# Patient Record
Sex: MALE | Race: WHITE
[De-identification: names, ages, dates, MRNs, and addresses within clinical notes are randomized per-mention and may not be internally consistent; named-entity substitution may affect disease eponyms.]

---

## 2018-02-08 ENCOUNTER — HOSPITAL ENCOUNTER (EMERGENCY)
Dept: HOSPITAL 53 - M ED | Age: 53
Discharge: HOME | End: 2018-02-08
Payer: MEDICARE

## 2018-02-08 DIAGNOSIS — Z88.0: ICD-10-CM

## 2018-02-08 DIAGNOSIS — Z91.041: ICD-10-CM

## 2018-02-08 DIAGNOSIS — G40.509: ICD-10-CM

## 2018-02-08 DIAGNOSIS — K76.9: ICD-10-CM

## 2018-02-08 DIAGNOSIS — K80.20: ICD-10-CM

## 2018-02-08 DIAGNOSIS — R16.0: ICD-10-CM

## 2018-02-08 DIAGNOSIS — F10.129: Primary | ICD-10-CM

## 2018-02-08 LAB
ALBUMIN/GLOBULIN RATIO: 0.63 (ref 1–1.93)
ALBUMIN: 3.1 GM/DL (ref 3.2–5.2)
ALKALINE PHOSPHATASE: 149 U/L (ref 45–117)
ALT SERPL W P-5'-P-CCNC: 34 U/L (ref 12–78)
ANION GAP: 12 MEQ/L (ref 8–16)
AST SERPL-CCNC: 85 U/L (ref 7–37)
BASO #: 0 10^3/UL (ref 0–0.2)
BASO %: 1 % (ref 0–1)
BILIRUB CONJ SERPL-MCNC: 2.9 MG/DL (ref 0–0.2)
BILIRUBIN,TOTAL: 5.7 MG/DL (ref 0.2–1)
BLOOD UREA NITROGEN: 8 MG/DL (ref 7–18)
CALCIUM LEVEL: 8.2 MG/DL (ref 8.5–10.1)
CARBON DIOXIDE LEVEL: 23 MEQ/L (ref 21–32)
CHLORIDE LEVEL: 100 MEQ/L (ref 98–107)
CREATININE FOR GFR: 0.89 MG/DL (ref 0.7–1.3)
EOS #: 0 10^3/UL (ref 0–0.5)
EOSINOPHIL NFR BLD AUTO: 0.2 % (ref 0–3)
GFR SERPL CREATININE-BSD FRML MDRD: > 60 ML/MIN/{1.73_M2} (ref 56–?)
GLUCOSE, FASTING: 151 MG/DL (ref 70–100)
HEMATOCRIT: 35.2 % (ref 42–52)
HEMOGLOBIN: 12.5 G/DL (ref 14–18)
IMMATURE GRANULOCYTE %: 0.2 % (ref 0–3)
IMMATURE PLATELET FRACTION %: 6.4 % (ref 0–10.9)
INR: 1.55
LIPASE: 268 U/L (ref 73–393)
LYMPH #: 0.6 10^3/UL (ref 1.5–4.5)
LYMPH %: 15 % (ref 24–44)
MAGNESIUM LEVEL: 2.1 MG/DL (ref 1.8–2.4)
MEAN CORPUSCULAR HEMOGLOBIN: 37.5 PG (ref 27–33)
MEAN CORPUSCULAR HGB CONC: 35.5 G/DL (ref 32–36.5)
MEAN CORPUSCULAR VOLUME: 105.7 FL (ref 80–96)
MONO #: 0.5 10^3/UL (ref 0–0.8)
MONO %: 11.4 % (ref 0–5)
NEUTROPHILS #: 3 10^3/UL (ref 1.8–7.7)
NEUTROPHILS %: 72.2 % (ref 36–66)
NRBC BLD AUTO-RTO: 0 % (ref 0–0)
PLATELET COUNT, AUTOMATED: 51 10^3/UL (ref 150–450)
POTASSIUM SERUM: 4.2 MEQ/L (ref 3.5–5.1)
PROTHROMBIN TIME: 19 SECONDS (ref 12.4–14.5)
RED BLOOD COUNT: 3.33 10^6/UL (ref 4.3–6.1)
RED CELL DISTRIBUTION WIDTH: 16.6 % (ref 11.5–14.5)
SODIUM LEVEL: 135 MEQ/L (ref 136–145)
TOTAL PROTEIN: 8 GM/DL (ref 6.4–8.2)
WHITE BLOOD COUNT: 4.1 10^3/UL (ref 4–10)

## 2018-02-08 RX ADMIN — MORPHINE SULFATE 1 MG: 4 INJECTION INTRAVENOUS at 16:25

## 2018-02-08 RX ADMIN — SODIUM CHLORIDE 1 MLS/HR: 9 INJECTION, SOLUTION INTRAVENOUS at 16:00

## 2018-02-08 RX ADMIN — LORAZEPAM 1 MG: 2 INJECTION INTRAMUSCULAR; INTRAVENOUS at 16:28

## 2018-02-08 RX ADMIN — ONDANSETRON 1 MG: 2 INJECTION INTRAMUSCULAR; INTRAVENOUS at 16:24

## 2019-01-20 ENCOUNTER — HOSPITAL ENCOUNTER (INPATIENT)
Dept: HOSPITAL 53 - M ED | Age: 54
LOS: 47 days | Discharge: NURSING, REHAB OR LONG-TERM CARE | DRG: 205 | End: 2019-03-08
Attending: INTERNAL MEDICINE | Admitting: INTERNAL MEDICINE
Payer: MEDICARE

## 2019-01-20 VITALS — SYSTOLIC BLOOD PRESSURE: 154 MMHG | DIASTOLIC BLOOD PRESSURE: 82 MMHG

## 2019-01-20 VITALS — DIASTOLIC BLOOD PRESSURE: 73 MMHG | SYSTOLIC BLOOD PRESSURE: 130 MMHG

## 2019-01-20 VITALS — DIASTOLIC BLOOD PRESSURE: 69 MMHG | SYSTOLIC BLOOD PRESSURE: 126 MMHG

## 2019-01-20 VITALS — SYSTOLIC BLOOD PRESSURE: 130 MMHG | DIASTOLIC BLOOD PRESSURE: 65 MMHG

## 2019-01-20 VITALS — DIASTOLIC BLOOD PRESSURE: 69 MMHG | SYSTOLIC BLOOD PRESSURE: 123 MMHG

## 2019-01-20 VITALS — DIASTOLIC BLOOD PRESSURE: 64 MMHG | SYSTOLIC BLOOD PRESSURE: 129 MMHG

## 2019-01-20 VITALS — DIASTOLIC BLOOD PRESSURE: 77 MMHG | SYSTOLIC BLOOD PRESSURE: 119 MMHG

## 2019-01-20 VITALS — DIASTOLIC BLOOD PRESSURE: 76 MMHG | SYSTOLIC BLOOD PRESSURE: 138 MMHG

## 2019-01-20 VITALS — SYSTOLIC BLOOD PRESSURE: 114 MMHG | DIASTOLIC BLOOD PRESSURE: 66 MMHG

## 2019-01-20 VITALS — DIASTOLIC BLOOD PRESSURE: 69 MMHG | SYSTOLIC BLOOD PRESSURE: 124 MMHG

## 2019-01-20 VITALS — SYSTOLIC BLOOD PRESSURE: 130 MMHG | DIASTOLIC BLOOD PRESSURE: 83 MMHG

## 2019-01-20 VITALS — SYSTOLIC BLOOD PRESSURE: 140 MMHG | DIASTOLIC BLOOD PRESSURE: 73 MMHG

## 2019-01-20 VITALS — DIASTOLIC BLOOD PRESSURE: 82 MMHG | SYSTOLIC BLOOD PRESSURE: 159 MMHG

## 2019-01-20 VITALS — SYSTOLIC BLOOD PRESSURE: 120 MMHG | DIASTOLIC BLOOD PRESSURE: 74 MMHG

## 2019-01-20 VITALS — DIASTOLIC BLOOD PRESSURE: 67 MMHG | SYSTOLIC BLOOD PRESSURE: 136 MMHG

## 2019-01-20 VITALS — HEIGHT: 70 IN | BODY MASS INDEX: 21.81 KG/M2 | WEIGHT: 152.34 LBS

## 2019-01-20 VITALS — DIASTOLIC BLOOD PRESSURE: 73 MMHG | SYSTOLIC BLOOD PRESSURE: 124 MMHG

## 2019-01-20 VITALS — SYSTOLIC BLOOD PRESSURE: 116 MMHG | DIASTOLIC BLOOD PRESSURE: 65 MMHG

## 2019-01-20 VITALS — DIASTOLIC BLOOD PRESSURE: 69 MMHG | SYSTOLIC BLOOD PRESSURE: 133 MMHG

## 2019-01-20 VITALS — SYSTOLIC BLOOD PRESSURE: 133 MMHG | DIASTOLIC BLOOD PRESSURE: 65 MMHG

## 2019-01-20 VITALS — SYSTOLIC BLOOD PRESSURE: 140 MMHG | DIASTOLIC BLOOD PRESSURE: 98 MMHG

## 2019-01-20 VITALS — DIASTOLIC BLOOD PRESSURE: 71 MMHG | SYSTOLIC BLOOD PRESSURE: 119 MMHG

## 2019-01-20 VITALS — DIASTOLIC BLOOD PRESSURE: 65 MMHG | SYSTOLIC BLOOD PRESSURE: 130 MMHG

## 2019-01-20 VITALS — SYSTOLIC BLOOD PRESSURE: 114 MMHG | DIASTOLIC BLOOD PRESSURE: 61 MMHG

## 2019-01-20 VITALS — SYSTOLIC BLOOD PRESSURE: 126 MMHG | DIASTOLIC BLOOD PRESSURE: 64 MMHG

## 2019-01-20 VITALS — SYSTOLIC BLOOD PRESSURE: 126 MMHG | DIASTOLIC BLOOD PRESSURE: 71 MMHG

## 2019-01-20 VITALS — DIASTOLIC BLOOD PRESSURE: 72 MMHG | SYSTOLIC BLOOD PRESSURE: 123 MMHG

## 2019-01-20 VITALS — SYSTOLIC BLOOD PRESSURE: 158 MMHG | DIASTOLIC BLOOD PRESSURE: 82 MMHG

## 2019-01-20 VITALS — SYSTOLIC BLOOD PRESSURE: 147 MMHG | DIASTOLIC BLOOD PRESSURE: 73 MMHG

## 2019-01-20 VITALS — DIASTOLIC BLOOD PRESSURE: 73 MMHG | SYSTOLIC BLOOD PRESSURE: 127 MMHG

## 2019-01-20 DIAGNOSIS — Z79.899: ICD-10-CM

## 2019-01-20 DIAGNOSIS — F17.200: ICD-10-CM

## 2019-01-20 DIAGNOSIS — M54.5: ICD-10-CM

## 2019-01-20 DIAGNOSIS — J43.9: ICD-10-CM

## 2019-01-20 DIAGNOSIS — Z88.0: ICD-10-CM
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
## 2019-01-20 DIAGNOSIS — Z91.041: ICD-10-CM

## 2019-01-20 DIAGNOSIS — E87.0: ICD-10-CM
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
## 2019-01-20 DIAGNOSIS — I48.0: ICD-10-CM

## 2019-01-20 DIAGNOSIS — K70.0: ICD-10-CM

## 2019-01-20 DIAGNOSIS — K70.30: ICD-10-CM

## 2019-01-20 DIAGNOSIS — J69.0: ICD-10-CM

## 2019-01-20 DIAGNOSIS — S22.20XA: ICD-10-CM

## 2019-01-20 DIAGNOSIS — F10.20: ICD-10-CM

## 2019-01-20 DIAGNOSIS — D68.4: ICD-10-CM

## 2019-01-20 DIAGNOSIS — K70.40: ICD-10-CM

## 2019-01-20 DIAGNOSIS — Z66: ICD-10-CM

## 2019-01-20 DIAGNOSIS — D69.6: ICD-10-CM

## 2019-01-20 DIAGNOSIS — S27.329A: Primary | ICD-10-CM

## 2019-01-20 LAB
ALBUMIN SERPL BCG-MCNC: 2.1 GM/DL (ref 3.2–5.2)
ALBUMIN SERPL BCG-MCNC: 2.3 GM/DL (ref 3.2–5.2)
ALT SERPL W P-5'-P-CCNC: 34 U/L (ref 12–78)
ALT SERPL W P-5'-P-CCNC: 38 U/L (ref 12–78)
APTT BLD: 51.4 SECONDS (ref 25.4–37.6)
BASE EXCESS BLDA CALC-SCNC: 4.3 MMOL/L (ref -2–2)
BASOPHILS # BLD AUTO: 0.1 10^3/UL (ref 0–0.2)
BASOPHILS NFR BLD AUTO: 1.2 % (ref 0–1)
BILIRUB CONJ SERPL-MCNC: 4.7 MG/DL (ref 0–0.2)
BILIRUB SERPL-MCNC: 6.9 MG/DL (ref 0.2–1)
BILIRUB SERPL-MCNC: 7.1 MG/DL (ref 0.2–1)
BUN SERPL-MCNC: 6 MG/DL (ref 7–18)
BUN SERPL-MCNC: 7 MG/DL (ref 7–18)
CALCIUM SERPL-MCNC: 7.6 MG/DL (ref 8.5–10.1)
CALCIUM SERPL-MCNC: 7.8 MG/DL (ref 8.5–10.1)
CHLORIDE SERPL-SCNC: 100 MEQ/L (ref 98–107)
CHLORIDE SERPL-SCNC: 98 MEQ/L (ref 98–107)
CK MB CFR.DF SERPL CALC: 0.77
CK MB SERPL-MCNC: 4 NG/ML (ref ?–3.6)
CK SERPL-CCNC: 466 U/L (ref 39–308)
CO2 BLDA CALC-SCNC: 28.9 MEQ/L (ref 22–29)
CO2 SERPL-SCNC: 22 MEQ/L (ref 21–32)
CO2 SERPL-SCNC: 28 MEQ/L (ref 21–32)
CREAT SERPL-MCNC: 0.6 MG/DL (ref 0.7–1.3)
CREAT SERPL-MCNC: 0.89 MG/DL (ref 0.7–1.3)
CRP SERPL-MCNC: 0.44 MG/DL (ref 0–0.3)
EOSINOPHIL # BLD AUTO: 0.1 10^3/UL (ref 0–0.5)
EOSINOPHIL NFR BLD AUTO: 1.4 % (ref 0–3)
GFR SERPL CREATININE-BSD FRML MDRD: > 60 ML/MIN/{1.73_M2} (ref 56–?)
GFR SERPL CREATININE-BSD FRML MDRD: > 60 ML/MIN/{1.73_M2} (ref 56–?)
GLUCOSE SERPL-MCNC: 114 MG/DL (ref 70–100)
GLUCOSE SERPL-MCNC: 158 MG/DL (ref 70–100)
HCO3 BLDA-SCNC: 27.8 MEQ/L (ref 22–26)
HCO3 STD BLDA-SCNC: 28.3 MEQ/L (ref 22–26)
HCT VFR BLD AUTO: 26.3 % (ref 42–52)
HCT VFR BLD AUTO: 29.2 % (ref 42–52)
HGB BLD-MCNC: 10.1 G/DL (ref 13.5–17.5)
HGB BLD-MCNC: 8.6 G/DL (ref 13.5–17.5)
INR PPP: 1.93
LIPASE SERPL-CCNC: 264 U/L (ref 73–393)
LYMPHOCYTES # BLD AUTO: 1.7 10^3/UL (ref 1.5–4.5)
LYMPHOCYTES NFR BLD AUTO: 33 % (ref 24–44)
MACROCYTES BLD QL SMEAR: (no result)
MAGNESIUM SERPL-MCNC: 1.5 MG/DL (ref 1.8–2.4)
MCH RBC QN AUTO: 39.8 PG (ref 27–33)
MCH RBC QN AUTO: 41.1 PG (ref 27–33)
MCHC RBC AUTO-ENTMCNC: 32.7 G/DL (ref 32–36.5)
MCHC RBC AUTO-ENTMCNC: 34.6 G/DL (ref 32–36.5)
MCV RBC AUTO: 118.7 FL (ref 80–96)
MCV RBC AUTO: 121.8 FL (ref 80–96)
MONOCYTES # BLD AUTO: 1.1 10^3/UL (ref 0–0.8)
MONOCYTES NFR BLD AUTO: 20.9 % (ref 0–5)
NEUTROPHILS # BLD AUTO: 2.2 10^3/UL (ref 1.8–7.7)
NEUTROPHILS NFR BLD AUTO: 42.5 % (ref 36–66)
PCO2 BLDA: 37.2 MMHG (ref 35–45)
PH BLDA: 7.49 UNITS (ref 7.35–7.45)
PLATELET # BLD AUTO: 48 10^3/UL (ref 150–450)
PLATELET # BLD AUTO: 75 10^3/UL (ref 150–450)
PLATELET BLD QL SMEAR: (no result)
PO2 BLDA: 139.8 MMHG (ref 75–100)
POTASSIUM SERPL-SCNC: 3.4 MEQ/L (ref 3.5–5.1)
POTASSIUM SERPL-SCNC: 3.6 MEQ/L (ref 3.5–5.1)
PROT SERPL-MCNC: 7 GM/DL (ref 6.4–8.2)
PROT SERPL-MCNC: 8.1 GM/DL (ref 6.4–8.2)
PROTHROMBIN TIME: 22.5 SECONDS (ref 12.1–14.4)
RBC # BLD AUTO: 2.16 10^6/UL (ref 4.3–6.1)
RBC # BLD AUTO: 2.46 10^6/UL (ref 4.3–6.1)
SAO2 % BLDA: 99.1 % (ref 95–99)
SODIUM SERPL-SCNC: 136 MEQ/L (ref 136–145)
SODIUM SERPL-SCNC: 137 MEQ/L (ref 136–145)
TROPONIN I SERPL-MCNC: < 0.02 NG/ML (ref ?–0.1)
WBC # BLD AUTO: 5.1 10^3/UL (ref 4–10)
WBC # BLD AUTO: 5.9 10^3/UL (ref 4–10)

## 2019-01-20 PROCEDURE — 30233K1 TRANSFUSION OF NONAUTOLOGOUS FROZEN PLASMA INTO PERIPHERAL VEIN, PERCUTANEOUS APPROACH: ICD-10-PCS | Performed by: INTERNAL MEDICINE

## 2019-01-20 PROCEDURE — 30233R1 TRANSFUSION OF NONAUTOLOGOUS PLATELETS INTO PERIPHERAL VEIN, PERCUTANEOUS APPROACH: ICD-10-PCS | Performed by: INTERNAL MEDICINE

## 2019-01-20 RX ADMIN — NICOTINE SCH PATCH: 21 PATCH, EXTENDED RELEASE TRANSDERMAL at 17:58

## 2019-01-20 RX ADMIN — OXAZEPAM SCH MG: 10 CAPSULE, GELATIN COATED ORAL at 16:35

## 2019-01-20 RX ADMIN — OXAZEPAM SCH MG: 10 CAPSULE, GELATIN COATED ORAL at 18:00

## 2019-01-20 RX ADMIN — IPRATROPIUM BROMIDE AND ALBUTEROL SULFATE SCH ML: .5; 3 SOLUTION RESPIRATORY (INHALATION) at 19:47

## 2019-01-20 RX ADMIN — DEXTROSE MONOHYDRATE SCH MLS/HR: 50 INJECTION, SOLUTION INTRAVENOUS at 21:38

## 2019-01-20 RX ADMIN — OXAZEPAM SCH MG: 10 CAPSULE, GELATIN COATED ORAL at 23:55

## 2019-01-20 RX ADMIN — Medication SCH MG: at 20:17

## 2019-01-20 RX ADMIN — IPRATROPIUM BROMIDE AND ALBUTEROL SULFATE SCH ML: .5; 3 SOLUTION RESPIRATORY (INHALATION) at 12:00

## 2019-01-20 NOTE — REPVR
EXAM: 

 CT Head Without Contrast 



EXAM DATE/TIME: 

 1/20/2019 7:16 AM 



CLINICAL HISTORY: 

 53 years old, male; Injury or trauma; Fall; Initial encounter; Concussion / 

head injury; Consciousness not specified 



TECHNIQUE: 

 Axial computed tomography images of the head/brain without contrast. 

 All CT scans at this facility use at least one of these dose optimization 

techniques: automated exposure control; mA and/or kV adjustment per patient 

size (includes targeted exams where dose is matched to clinical indication); or 

iterative reconstruction. 



COMPARISON: 

 CT Head without contrast 2/8/2018 3:57 PM 



FINDINGS: 

 Limitations:  Examination is limited by motion artifact. 

 Brain:  No acute intracranial hemorrhage. Diffuse cerebral atrophy consistent 

with patient's age. No acute mass effect. Cortical gray-white matter 

differentiation is preserved.

 Ventricles: Ventricles are in proportion to the degree of atrophy.

 Bones/joints: Normal. No acute fracture. 

 Sinuses: Normal as visualized. No acute sinusitis. 

 Mastoid air cells: Normal as visualized. No mastoid effusion. 

 Soft tissues: Normal. 



IMPRESSION: 

No acute intracranial hemorrhage.

Mild hypodensities in the periventricular white matter which are consistent 

with chronic small vessel ischemic disease. Unchanged from prior.

Ventricles are in proportion to the degree of atrophy. Mildly increased from 

prior.



Electronically signed by: Meme Ramirez On 01/20/2019  07:41:46 AM

## 2019-01-20 NOTE — ECGEPIP
Stationary ECG Study

                           [facility] - ED

                                       

                                       Test Date:    2019

Pat Name:     KIRILL BULLOCK               Department:   

Patient ID:   [id number]                 Room:         -

Gender:       M                        Technician:   lisbeth

:          1965               Requested By: Maja Lundborg-Gray 

Order Number: [id number]     Reading MD:   Maja Lundborg-Gray

                                 Measurements

Intervals                              Axis          

Rate:         94                       P:            52

WI:           164                      QRS:          66

QRSD:         83                       T:            49

QT:           374                                    

QTc:          470                                    

                           Interpretive Statements

SINUS RHYTHM

NONSPECIFIC ST T WAVE CHANGES

 

CW 18 RATE INCREASED

NONSPECIFIC ST T WAVE CHANGES

 

Electronically Signed On 2019 9:35:36 EST by Maja Lundborg-Gray

## 2019-01-20 NOTE — PHACANCOPD
PHARMACY VANCOMYCIN DOSING


Pt Demographics


Demographics


Patient Age:53 , Weight:89.700  , Gender: male


Adjusted Body Weight


Date: 1/20/19, Adjusted Body Weight: [NA] Kg





Events Past 24 Hours


Events Past 24 Hours:  NO: Dialysis, Diuretic Therapy, Change in CrCl, Fever, 

Elevation in WBC, Pending Diagnostics, Pending Procedures, Other





Vancomycin


Vancomycin indication:  pneumonia


Vancomycin Target Ranges:  15-20 mcg/ml


Vancomycin Load Y/N:  Yes


Load Dose Date Time


Vancomycin Load Dose:  1750mg       Date: 1/20        Time:  ~20:00


Vancomycin Dose


Date: 1/20/19. Current Vancomycin Dose: [1g IV q12h @09]


Intermittent Dosing?:  Yes





Labs


Labs











Item Value  Date Time


 


Creatinine 0.60 MG/DL L 1/20/19 0217


 


White Blood Count 5.1 10^3/uL 1/20/19 0217








Micro





Microbiology


1/20/19 Blood Culture, Received


          Pending


1/20/19 Blood Culture, Received


          Pending


Creatinine Clearance


Date:1/20/19. Creatinine Clearance: [>100 ml/min].


Pending Labs


Vanco trough scheduled 1/22 @08:00





Assessment and Plan


Maintaining Current Dose?:  Yes


Reason for dose change:  No Dose Change





Pharmacist Note


Pharmacist Note


Date: 1/20/19. Pharmacist note: pt has been started on Avelox and vancomycin for

pneumonia. Pt has not been on vancomycin at our facility in the past. Sputum 

culture from 2017 grew Pseudomonas. Current blood cultures are pending. I have 

started the pt on vancomycin 750mg IV now, with 1g IV q12h starting ~1 hour 

later for a 1.75g load. We will continue to monitor and make adjustments as 

necessary.











Bar Jorgensen Pharm.D.           Jan 20, 2019 18:55
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
## 2019-01-20 NOTE — REPVR
EXAM: 

 CT Abdomen and Pelvis Without Contrast 



EXAM DATE/TIME: 

 1/20/2019 2:40 AM 



CLINICAL HISTORY: 

 53 years old, male; Pain; Abdominal pain; Additional info: Tr/contrast allergy 



TECHNIQUE: 

 Axial computed tomography images of the abdomen and pelvis without contrast. 

 All CT scans at this facility use at least one of these dose optimization 

techniques: automated exposure control; mA and/or kV adjustment per patient 

size (includes targeted exams where dose is matched to clinical indication); or 

iterative reconstruction. 

 Coronal and sagittal reformatted images were created and reviewed. 



COMPARISON: 

 LIVER US 2/8/2018 5:07 PM 



FINDINGS: 

 Limitations:  Examination is limited by motion artifact. 

 Lower thorax:  See CT chest report. 



ABDOMEN: 

 Liver:  Mildly lobulated contour of the liver. Fatty infiltration of the 

liver. 

 Gallbladder and bile ducts:  Multiple small gallstones layering in the 

gallbladder. No gallbladder wall thickening. No biliary ductal dilatation. 

 Pancreas: Normal. No ductal dilation. 

 Spleen: Normal. No splenomegaly. 

 Adrenals: Normal. No mass. 

 Kidneys and ureters: Normal. No hydronephrosis. 

 Stomach and bowel:  Copious stool throughout the colon. Negative for colonic 

diverticulitis. No abnormal bowel dilatation. No abnormal bowel wall 

thickening. 

 Appendix:  Appendix is normal. 



PELVIS: 

 Bladder: Unremarkable as visualized. 

 Reproductive:  There are calcifications within the prostate which are likely 

incidental. Prostate is not enlarged.



ABDOMEN and PELVIS: 

 Intraperitoneal space: Normal. No free air. No significant fluid collection. 

 Bones/joints:  Right L5 spondylolysis. No acute fracture.

 Soft tissues:  Small supraumbilical hernia containing loops of small bowel. No 

evidence of bowel obstruction. Small umbilical hernia containing fat. There is 

no evidence of strangulation. Post surgical changes in left inguinal region. 

There is dependent subcutaneous edema.

 Vasculature:  Moderate calcified atherosclerotic disease. No aortic aneurysm.

 Lymph nodes: Normal. No enlarged lymph nodes. 



IMPRESSION: 

Mildly lobulated contour of the liver. Possible mild cirrhosis.

Fatty infiltration of the liver. 

Cholelithiasis without acute cholecystitis.

Copious stool throughout the colon.

Small supraumbilical hernia containing loops of small bowel. No evidence of 

bowel obstruction.

Additional findings as described.





Electronically signed by: Meme Ramirez On 01/20/2019  03:16:04 AM

## 2019-01-21 VITALS — SYSTOLIC BLOOD PRESSURE: 133 MMHG | DIASTOLIC BLOOD PRESSURE: 66 MMHG

## 2019-01-21 VITALS — SYSTOLIC BLOOD PRESSURE: 131 MMHG | DIASTOLIC BLOOD PRESSURE: 64 MMHG

## 2019-01-21 VITALS — SYSTOLIC BLOOD PRESSURE: 140 MMHG | DIASTOLIC BLOOD PRESSURE: 70 MMHG

## 2019-01-21 VITALS — SYSTOLIC BLOOD PRESSURE: 141 MMHG | DIASTOLIC BLOOD PRESSURE: 63 MMHG

## 2019-01-21 VITALS — DIASTOLIC BLOOD PRESSURE: 69 MMHG | SYSTOLIC BLOOD PRESSURE: 133 MMHG

## 2019-01-21 VITALS — SYSTOLIC BLOOD PRESSURE: 154 MMHG | DIASTOLIC BLOOD PRESSURE: 79 MMHG

## 2019-01-21 VITALS — SYSTOLIC BLOOD PRESSURE: 143 MMHG | DIASTOLIC BLOOD PRESSURE: 71 MMHG

## 2019-01-21 VITALS — SYSTOLIC BLOOD PRESSURE: 132 MMHG | DIASTOLIC BLOOD PRESSURE: 67 MMHG

## 2019-01-21 VITALS — DIASTOLIC BLOOD PRESSURE: 69 MMHG | SYSTOLIC BLOOD PRESSURE: 141 MMHG

## 2019-01-21 VITALS — DIASTOLIC BLOOD PRESSURE: 81 MMHG | SYSTOLIC BLOOD PRESSURE: 141 MMHG

## 2019-01-21 VITALS — SYSTOLIC BLOOD PRESSURE: 125 MMHG | DIASTOLIC BLOOD PRESSURE: 61 MMHG

## 2019-01-21 VITALS — SYSTOLIC BLOOD PRESSURE: 132 MMHG | DIASTOLIC BLOOD PRESSURE: 86 MMHG

## 2019-01-21 VITALS — SYSTOLIC BLOOD PRESSURE: 130 MMHG | DIASTOLIC BLOOD PRESSURE: 77 MMHG

## 2019-01-21 VITALS — SYSTOLIC BLOOD PRESSURE: 156 MMHG | DIASTOLIC BLOOD PRESSURE: 82 MMHG

## 2019-01-21 LAB
ALBUMIN SERPL BCG-MCNC: 2 GM/DL (ref 3.2–5.2)
ALT SERPL W P-5'-P-CCNC: 32 U/L (ref 12–78)
APTT BLD: 47.2 SECONDS (ref 25.4–37.6)
BASOPHILS # BLD AUTO: 0 10^3/UL (ref 0–0.2)
BASOPHILS NFR BLD AUTO: 0.1 % (ref 0–1)
BILIRUB SERPL-MCNC: 6.4 MG/DL (ref 0.2–1)
BUN SERPL-MCNC: 10 MG/DL (ref 7–18)
BUN SERPL-MCNC: 8 MG/DL (ref 7–18)
CALCIUM SERPL-MCNC: 7.8 MG/DL (ref 8.5–10.1)
CALCIUM SERPL-MCNC: 7.8 MG/DL (ref 8.5–10.1)
CHLORIDE SERPL-SCNC: 102 MEQ/L (ref 98–107)
CHLORIDE SERPL-SCNC: 104 MEQ/L (ref 98–107)
CO2 SERPL-SCNC: 25 MEQ/L (ref 21–32)
CO2 SERPL-SCNC: 27 MEQ/L (ref 21–32)
CREAT SERPL-MCNC: 0.62 MG/DL (ref 0.7–1.3)
CREAT SERPL-MCNC: 0.72 MG/DL (ref 0.7–1.3)
EOSINOPHIL # BLD AUTO: 0 10^3/UL (ref 0–0.5)
EOSINOPHIL NFR BLD AUTO: 0 % (ref 0–3)
FERRITIN SERPL-MCNC: 736 NG/ML (ref 26–388)
FOLATE SERPL-MCNC: 6.4 NG/ML
GFR SERPL CREATININE-BSD FRML MDRD: > 60 ML/MIN/{1.73_M2} (ref 56–?)
GFR SERPL CREATININE-BSD FRML MDRD: > 60 ML/MIN/{1.73_M2} (ref 56–?)
GLUCOSE SERPL-MCNC: 106 MG/DL (ref 70–100)
GLUCOSE SERPL-MCNC: 88 MG/DL (ref 70–100)
HCT VFR BLD AUTO: 24.6 % (ref 42–52)
HGB BLD-MCNC: 8.1 G/DL (ref 13.5–17.5)
INR PPP: 2.02
IRON SATN MFR SERPL: 45.9 % (ref 19.7–50)
IRON SERPL-MCNC: 72 UG/DL (ref 65–175)
LYMPHOCYTES # BLD AUTO: 1.4 10^3/UL (ref 1.5–4.5)
LYMPHOCYTES NFR BLD AUTO: 14.4 % (ref 24–44)
MACROCYTES BLD QL SMEAR: (no result)
MAGNESIUM SERPL-MCNC: 1.6 MG/DL (ref 1.8–2.4)
MAGNESIUM SERPL-MCNC: 1.8 MG/DL (ref 1.8–2.4)
MCH RBC QN AUTO: 40.1 PG (ref 27–33)
MCHC RBC AUTO-ENTMCNC: 32.9 G/DL (ref 32–36.5)
MCV RBC AUTO: 121.8 FL (ref 80–96)
MONOCYTES # BLD AUTO: 1.2 10^3/UL (ref 0–0.8)
MONOCYTES NFR BLD AUTO: 13.1 % (ref 0–5)
NEUTROPHILS # BLD AUTO: 6.8 10^3/UL (ref 1.8–7.7)
NEUTROPHILS NFR BLD AUTO: 72.1 % (ref 36–66)
PLATELET # BLD AUTO: 68 10^3/UL (ref 150–450)
PLATELET BLD QL SMEAR: (no result)
POLYCHROMASIA BLD QL SMEAR: (no result)
POTASSIUM SERPL-SCNC: 3.1 MEQ/L (ref 3.5–5.1)
POTASSIUM SERPL-SCNC: 3.7 MEQ/L (ref 3.5–5.1)
PROT SERPL-MCNC: 6.9 GM/DL (ref 6.4–8.2)
PROTHROMBIN TIME: 23.2 SECONDS (ref 12.1–14.4)
RBC # BLD AUTO: 2.02 10^6/UL (ref 4.3–6.1)
SODIUM SERPL-SCNC: 137 MEQ/L (ref 136–145)
SODIUM SERPL-SCNC: 138 MEQ/L (ref 136–145)
TIBC SERPL-MCNC: 157 UG/DL (ref 250–450)
WBC # BLD AUTO: 9.5 10^3/UL (ref 4–10)

## 2019-01-21 RX ADMIN — IPRATROPIUM BROMIDE AND ALBUTEROL SULFATE SCH ML: .5; 3 SOLUTION RESPIRATORY (INHALATION) at 11:18

## 2019-01-21 RX ADMIN — FOLIC ACID SCH MG: 1 TABLET ORAL at 08:54

## 2019-01-21 RX ADMIN — MULTIPLE VITAMINS W/ MINERALS TAB SCH TAB: TAB at 08:54

## 2019-01-21 RX ADMIN — IPRATROPIUM BROMIDE AND ALBUTEROL SULFATE SCH ML: .5; 3 SOLUTION RESPIRATORY (INHALATION) at 06:26

## 2019-01-21 RX ADMIN — IPRATROPIUM BROMIDE AND ALBUTEROL SULFATE SCH ML: .5; 3 SOLUTION RESPIRATORY (INHALATION) at 19:59

## 2019-01-21 RX ADMIN — DEXTROSE MONOHYDRATE SCH MLS/HR: 50 INJECTION, SOLUTION INTRAVENOUS at 08:54

## 2019-01-21 RX ADMIN — Medication SCH MLS/HR: at 08:54

## 2019-01-21 RX ADMIN — OXAZEPAM SCH MG: 10 CAPSULE, GELATIN COATED ORAL at 16:04

## 2019-01-21 RX ADMIN — POTASSIUM CHLORIDE SCH MEQ: 1.5 POWDER, FOR SOLUTION ORAL at 06:33

## 2019-01-21 RX ADMIN — NICOTINE SCH PATCH: 21 PATCH, EXTENDED RELEASE TRANSDERMAL at 08:53

## 2019-01-21 RX ADMIN — IPRATROPIUM BROMIDE AND ALBUTEROL SULFATE SCH ML: .5; 3 SOLUTION RESPIRATORY (INHALATION) at 15:28

## 2019-01-21 RX ADMIN — Medication SCH MG: at 08:54

## 2019-01-21 RX ADMIN — POTASSIUM CHLORIDE SCH MEQ: 1.5 POWDER, FOR SOLUTION ORAL at 04:11

## 2019-01-21 RX ADMIN — METRONIDAZOLE SCH MLS/HR: 500 INJECTION, SOLUTION INTRAVENOUS at 16:04

## 2019-01-21 RX ADMIN — SODIUM CHLORIDE, PRESERVATIVE FREE SCH ML: 5 INJECTION INTRAVENOUS at 20:12

## 2019-01-21 RX ADMIN — LACTULOSE SCH ML: 10 SOLUTION ORAL at 18:00

## 2019-01-21 RX ADMIN — LACTULOSE SCH ML: 10 SOLUTION ORAL at 18:16

## 2019-01-21 RX ADMIN — Medication SCH MG: at 20:11

## 2019-01-21 RX ADMIN — OXAZEPAM SCH MG: 10 CAPSULE, GELATIN COATED ORAL at 08:54

## 2019-01-21 RX ADMIN — POTASSIUM CHLORIDE SCH MEQ: 1.5 POWDER, FOR SOLUTION ORAL at 05:17

## 2019-01-21 RX ADMIN — DEXTROSE MONOHYDRATE SCH MLS/HR: 50 INJECTION, SOLUTION INTRAVENOUS at 15:00

## 2019-01-21 RX ADMIN — ACETAMINOPHEN PRN MG: 325 TABLET ORAL at 16:20

## 2019-01-21 NOTE — ECGEPIP
Stationary ECG Study

                              [facility]

                                       

                                       Test Date:    2019

Pat Name:     KIRILL BULLOCK               Department:   er

Patient ID:   [id number]                 Room:         [place] [address]

Gender:       M                        Technician:   XAVI

:          1965               Requested By: SHANNON TORRES 

Order Number: [id number]     Reading MD:   Moses Tucker

                                 Measurements

Intervals                              Axis          

Rate:         125                      P:            67

MT:           162                      QRS:          70

QRSD:         86                       T:            17

QT:           315                                    

QTc:          455                                    

                           Interpretive Statements

Sinus tachycardia

Nonspecific ST-T wave abnormalities

Compared to prior tracing of earlier this date, heart rate is faster

Electronically Signed On 2019 21:04:40 EST by Moses Tucker
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
## 2019-01-21 NOTE — NUR
Pt presented with severe oropharyngeal dysphagia. He demonstrated signs of aspiration on 
every trialed consistency. ST recommends NPO at this time with the exception of necessary 
medication. If medication needs to be provided orally, assure pt is sitting upright in bed 
and provide pills one at a time with 1/2 teaspoon of pudding. ST recommends therapy daily 
for dysphagia management. 

-------------------------------------------------------------------------------

Addendum: 01/21/19 at 1549 by SUZANNE SANTOS

-------------------------------------------------------------------------------

Amended: Links added.
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
## 2019-01-21 NOTE — REP
PORTABLE CHEST X-RAY:  Single view.

 

HISTORY:  Follow-up.

 

COMPARISON STUDY:  January 20, 2017. Comparison chest CT study January 20, 2019

showed right middle lobe ground-glass opacities and endobronchial inspissated

mucus in the right lower lobe.

 

FINDINGS:  There is a large dense new infiltrate in the right lower lobe

consistent with pneumonia.  Remaining lung fields are clear.  Heart is not

enlarged. The right lower lobe infiltrate is much more prominent radiographically

than on the  film from yesterday's CT study.

 

IMPRESSION: New large dense right lower lobe infiltrate consistent with

pneumonia.

 

 

Electronically Signed by

Freddie Lyles MD 01/21/2019 10:42 A

## 2019-01-21 NOTE — IPNPDOC
Text Note


Date of Service


The patient was seen on 1/21/19.





NOTE


No acute events overnight. He is able to answer simple questions. Still having 

pain in the chest and abdomen. He is listening to the nurses, and is able to eat

a drink a little with assistance. Denies SOB. 





T max - 100.5, Tc - 100.2, tachy, BP stable





NAD





abd - soft, slight tenderness to palpation diffuse


chest - large bruise in the center of the chest with vertical erythematous 

bruise in the middle of it. decrease breath sounds on the right





labs - below





A) 54y/o male s/p trauma to the chest with sternal fracture


alcoholism with withdrawl symptoms


cirrhosis





P) reg diet


pain control


CIWA protocol


ambulate 


IS


chest PT


will monitor as needed





John Mantilla DO





VS,Luiz, I+O


VS, Luiz, I+O


Laboratory Tests


1/20/19 19:13








Red Blood Count 2.16 L, Mean Corpuscular Volume 121.8 H, Mean Corpuscular 

Hemoglobin 39.8 H, Mean Corpuscular Hemoglobin Concent 32.7, Red Cell 

Distribution Width 16.7 H, Calcium Level 7.8 L, Aspartate Amino Transf 

(AST/SGOT) 84 H, Alanine Aminotransferase (ALT/SGPT) 34, Alkaline Phosphatase 

118 H, Total Bilirubin 6.9 H, Total Protein 7.0, Albumin 2.1 L





1/21/19 02:36








Red Blood Count 2.02 L, Mean Corpuscular Volume 121.8 H, Mean Corpuscular 

Hemoglobin 40.1 H, Mean Corpuscular Hemoglobin Concent 32.9, Red Cell 

Distribution Width 16.6 H, Calcium Level 7.8 L, Aspartate Amino Transf 

(AST/SGOT) 79 H, Alanine Aminotransferase (ALT/SGPT) 32, Alkaline Phosphatase 

107, Total Bilirubin 6.4 H, Total Protein 6.9, Albumin 2.0 L, Neutrophils (%) 

(Auto) 72.1 H, Lymphocytes (%) (Auto) 14.4 L, Monocytes (%) (Auto) 13.1 H, 

Eosinophils (%) (Auto) 0.0, Basophils (%) (Auto) 0.1, Neutrophils # (Auto) 6.8, 

Lymphocytes # (Auto) 1.4 L, Monocytes # (Auto) 1.2 H, Eosinophils # (Auto) 0.0, 

Basophils # (Auto) 0.0





1/21/19 09:52








Calcium Level 7.8 L








Vital Signs








  Date Time  Temp Pulse Resp B/P (MAP) Pulse Ox O2 Delivery O2 Flow Rate FiO2


 


1/21/19 08:00  107  141/69    


 


1/21/19 05:30     93   


 


1/21/19 05:00 100.2       


 


1/21/19 03:00   16   Room Air  


 


1/20/19 12:00       1.0 














I&O- Last 24 Hours up to 6 AM 


 


 1/21/19





 06:00


 


Intake Total 3445 ml


 


Balance 3445 ml

















DARRYL MANTILLA DO            Jan 21, 2019 11:44
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
## 2019-01-22 VITALS — SYSTOLIC BLOOD PRESSURE: 156 MMHG | DIASTOLIC BLOOD PRESSURE: 83 MMHG

## 2019-01-22 VITALS — DIASTOLIC BLOOD PRESSURE: 76 MMHG | SYSTOLIC BLOOD PRESSURE: 135 MMHG

## 2019-01-22 VITALS — DIASTOLIC BLOOD PRESSURE: 89 MMHG | SYSTOLIC BLOOD PRESSURE: 153 MMHG

## 2019-01-22 VITALS — DIASTOLIC BLOOD PRESSURE: 78 MMHG | SYSTOLIC BLOOD PRESSURE: 166 MMHG

## 2019-01-22 VITALS — SYSTOLIC BLOOD PRESSURE: 153 MMHG | DIASTOLIC BLOOD PRESSURE: 83 MMHG

## 2019-01-22 VITALS — DIASTOLIC BLOOD PRESSURE: 86 MMHG | SYSTOLIC BLOOD PRESSURE: 148 MMHG

## 2019-01-22 LAB
ALBUMIN SERPL BCG-MCNC: 2 GM/DL (ref 3.2–5.2)
ALT SERPL W P-5'-P-CCNC: 30 U/L (ref 12–78)
ANISOCYTOSIS BLD QL SMEAR: (no result)
APTT BLD: 46.6 SECONDS (ref 25.4–37.6)
BASE EXCESS BLDA CALC-SCNC: 0.2 MMOL/L (ref -2–2)
BASOPHILS # BLD AUTO: 0 10^3/UL (ref 0–0.2)
BASOPHILS NFR BLD AUTO: 0.4 % (ref 0–1)
BILIRUB SERPL-MCNC: 9.3 MG/DL (ref 0.2–1)
BUN SERPL-MCNC: 8 MG/DL (ref 7–18)
CALCIUM SERPL-MCNC: 7.6 MG/DL (ref 8.5–10.1)
CHLORIDE SERPL-SCNC: 103 MEQ/L (ref 98–107)
CO2 BLDA CALC-SCNC: 23.2 MEQ/L (ref 22–29)
CO2 SERPL-SCNC: 25 MEQ/L (ref 21–32)
CREAT SERPL-MCNC: 0.6 MG/DL (ref 0.7–1.3)
EOSINOPHIL # BLD AUTO: 0 10^3/UL (ref 0–0.5)
EOSINOPHIL NFR BLD AUTO: 0.6 % (ref 0–3)
GFR SERPL CREATININE-BSD FRML MDRD: > 60 ML/MIN/{1.73_M2} (ref 56–?)
GLUCOSE SERPL-MCNC: 104 MG/DL (ref 70–100)
HCO3 BLDA-SCNC: 22.4 MEQ/L (ref 22–26)
HCO3 STD BLDA-SCNC: 24.7 MEQ/L (ref 22–26)
HCT VFR BLD AUTO: 26.3 % (ref 42–52)
HGB BLD-MCNC: 8.6 G/DL (ref 13.5–17.5)
INR PPP: 2.37
LYMPHOCYTES # BLD AUTO: 1.2 10^3/UL (ref 1.5–4.5)
LYMPHOCYTES NFR BLD AUTO: 17 % (ref 24–44)
MACROCYTES BLD QL SMEAR: (no result)
MAGNESIUM SERPL-MCNC: 1.7 MG/DL (ref 1.8–2.4)
MCH RBC QN AUTO: 40.4 PG (ref 27–33)
MCHC RBC AUTO-ENTMCNC: 32.7 G/DL (ref 32–36.5)
MCV RBC AUTO: 123.5 FL (ref 80–96)
MONOCYTES # BLD AUTO: 0.7 10^3/UL (ref 0–0.8)
MONOCYTES NFR BLD AUTO: 10.2 % (ref 0–5)
NEUTROPHILS # BLD AUTO: 5.2 10^3/UL (ref 1.8–7.7)
NEUTROPHILS NFR BLD AUTO: 71.2 % (ref 36–66)
PCO2 BLDA: 27.6 MMHG (ref 35–45)
PH BLDA: 7.53 UNITS (ref 7.35–7.45)
PLATELET # BLD AUTO: 56 10^3/UL (ref 150–450)
PLATELET BLD QL SMEAR: (no result)
PO2 BLDA: 97.7 MMHG (ref 75–100)
POLYCHROMASIA BLD QL SMEAR: (no result)
POTASSIUM SERPL-SCNC: 3.4 MEQ/L (ref 3.5–5.1)
PROT SERPL-MCNC: 6.8 GM/DL (ref 6.4–8.2)
PROTHROMBIN TIME: 26.4 SECONDS (ref 12.1–14.4)
RBC # BLD AUTO: 2.13 10^6/UL (ref 4.3–6.1)
SAO2 % BLDA: 98.1 % (ref 95–99)
SODIUM SERPL-SCNC: 134 MEQ/L (ref 136–145)
VANCOMYCIN TROUGH SERPL-MCNC: 1 UG/ML (ref 10–20)
WBC # BLD AUTO: 7.2 10^3/UL (ref 4–10)

## 2019-01-22 RX ADMIN — DEXTROSE MONOHYDRATE SCH MLS/HR: 50 INJECTION, SOLUTION INTRAVENOUS at 03:45

## 2019-01-22 RX ADMIN — FUROSEMIDE SCH MG: 10 INJECTION, SOLUTION INTRAMUSCULAR; INTRAVENOUS at 09:18

## 2019-01-22 RX ADMIN — Medication SCH MG: at 08:03

## 2019-01-22 RX ADMIN — IPRATROPIUM BROMIDE AND ALBUTEROL SULFATE SCH ML: .5; 3 SOLUTION RESPIRATORY (INHALATION) at 19:07

## 2019-01-22 RX ADMIN — LACTULOSE SCH ML: 10 SOLUTION ORAL at 11:45

## 2019-01-22 RX ADMIN — IPRATROPIUM BROMIDE AND ALBUTEROL SULFATE SCH ML: .5; 3 SOLUTION RESPIRATORY (INHALATION) at 07:33

## 2019-01-22 RX ADMIN — LORAZEPAM PRN MG: 2 INJECTION INTRAMUSCULAR; INTRAVENOUS at 00:37

## 2019-01-22 RX ADMIN — DEXTROSE MONOHYDRATE SCH MLS/HR: 50 INJECTION, SOLUTION INTRAVENOUS at 16:33

## 2019-01-22 RX ADMIN — Medication SCH MLS/HR: at 07:51

## 2019-01-22 RX ADMIN — METRONIDAZOLE SCH MLS/HR: 500 INJECTION, SOLUTION INTRAVENOUS at 16:47

## 2019-01-22 RX ADMIN — METRONIDAZOLE SCH MLS/HR: 500 INJECTION, SOLUTION INTRAVENOUS at 08:02

## 2019-01-22 RX ADMIN — LACTULOSE SCH ML: 10 SOLUTION ORAL at 00:00

## 2019-01-22 RX ADMIN — LORAZEPAM PRN MG: 2 INJECTION INTRAMUSCULAR; INTRAVENOUS at 21:01

## 2019-01-22 RX ADMIN — LORAZEPAM PRN MG: 2 INJECTION INTRAMUSCULAR; INTRAVENOUS at 06:06

## 2019-01-22 RX ADMIN — PHYTONADIONE SCH MG: 5 TABLET ORAL at 09:17

## 2019-01-22 RX ADMIN — OXAZEPAM SCH MG: 10 CAPSULE, GELATIN COATED ORAL at 00:00

## 2019-01-22 RX ADMIN — FOLIC ACID SCH MG: 1 TABLET ORAL at 08:03

## 2019-01-22 RX ADMIN — MULTIPLE VITAMINS W/ MINERALS TAB SCH TAB: TAB at 08:03

## 2019-01-22 RX ADMIN — MINERAL OIL AND PETROLATUM SCH DOSE: 150; 830 OINTMENT OPHTHALMIC at 21:01

## 2019-01-22 RX ADMIN — METRONIDAZOLE SCH MLS/HR: 500 INJECTION, SOLUTION INTRAVENOUS at 00:01

## 2019-01-22 RX ADMIN — MINERAL OIL AND PETROLATUM SCH DOSE: 150; 830 OINTMENT OPHTHALMIC at 16:47

## 2019-01-22 RX ADMIN — SODIUM CHLORIDE, PRESERVATIVE FREE SCH ML: 5 INJECTION INTRAVENOUS at 21:01

## 2019-01-22 RX ADMIN — OXAZEPAM SCH MG: 10 CAPSULE, GELATIN COATED ORAL at 07:57

## 2019-01-22 RX ADMIN — FUROSEMIDE SCH MG: 10 INJECTION, SOLUTION INTRAMUSCULAR; INTRAVENOUS at 16:33

## 2019-01-22 RX ADMIN — Medication SCH MG: at 21:01

## 2019-01-22 RX ADMIN — NICOTINE SCH PATCH: 21 PATCH, EXTENDED RELEASE TRANSDERMAL at 08:04

## 2019-01-22 RX ADMIN — MINERAL OIL AND PETROLATUM SCH DOSE: 150; 830 OINTMENT OPHTHALMIC at 09:00

## 2019-01-22 RX ADMIN — IPRATROPIUM BROMIDE AND ALBUTEROL SULFATE SCH ML: .5; 3 SOLUTION RESPIRATORY (INHALATION) at 11:22

## 2019-01-22 RX ADMIN — LACTULOSE SCH ML: 10 SOLUTION ORAL at 06:00

## 2019-01-22 RX ADMIN — IPRATROPIUM BROMIDE AND ALBUTEROL SULFATE SCH ML: .5; 3 SOLUTION RESPIRATORY (INHALATION) at 17:24

## 2019-01-22 RX ADMIN — LACTULOSE SCH ML: 10 SOLUTION ORAL at 17:31

## 2019-01-22 RX ADMIN — SODIUM CHLORIDE, PRESERVATIVE FREE SCH ML: 5 INJECTION INTRAVENOUS at 06:00

## 2019-01-22 RX ADMIN — OXAZEPAM SCH MG: 10 CAPSULE, GELATIN COATED ORAL at 16:32

## 2019-01-22 RX ADMIN — SODIUM CHLORIDE, PRESERVATIVE FREE SCH ML: 5 INJECTION INTRAVENOUS at 14:00

## 2019-01-22 NOTE — REP
Portable chest x-ray:  Single view.

 

History:  Right lower lobe pneumonia.

 

Comparison study:  January 21, 2019.

 

Findings:  Consolidation persists in the right base medially overlying the heart

consistent with consolidation in the middle and/or lower lobe.  The degree of

aeration is somewhat improved compared with yesterday's radiograph.  No new

infiltrate is seen.

 

Impression:

 

Improving aeration right base.

 

 

Electronically Signed by

Freddie Lyles MD 01/22/2019 09:14 A

## 2019-01-22 NOTE — IPNPDOC
Date Seen


The patient was seen on 1/22/19.





Progress Note


Subjective: Pt is communicative this morning, has a slightly slow speech but he 

understand that he is in [facility] and he is following some command but

does fall asleep in between in-commend. He continues to go through withdrawals 

and is continue to get medication per [facility] protocol. Because mention is not 

improving. will obtain CT head.





PHYSICAL EXAMINATION:


Vitals: (see below) 


General: No acute distress, laying comfortably in bed.


HEENT: Moist mucous membranes. Bruising over the right orbital region. eyes 

closed, sclera incterus. 


Neck: No JVD or lymphadenopathy


Cardiac: RRR, No murmurs. Significant ecchymosis on the chest wall. Tender to 

palpation no crepitus appreciated.


Pulm: Clear to auscultation b/l. No wheezing, rhonchi


Abd: NT/mildly distended but soft. + BS


Ext: 2+ non-pitting edema bilateral lower extremities. No cyanosis.


Neuro: Tremulous


Skin: Jaundice, and slight flushed face and base of neck





LABORATORY DATA: See below.





IMAGING: 


CT chest on 1/20/19


IMPRESSION: 


Ground glass opacities in the right middle lobe. Pneumonia versus contusion.


Opacification of the right lower lobe bronchi. Bronchial mucous plugging in the 


left lower lobe. 


Emphysematous lung disease.


Acute mildly displaced fracture of the sternal body. Early with history.





CT abdomen and pelvis on 1/20/19


IMPRESSION: 


Mildly lobulated contour of the liver. Possible mild cirrhosis.


Fatty infiltration of the liver. 


Cholelithiasis without acute cholecystitis.


Copious stool throughout the colon.


Small supraumbilical hernia containing loops of small bowel. No evidence of 


bowel obstruction.





CT head on 1/20/19


IMPRESSION: 


No acute intracranial hemorrhage.


Mild hypodensities in the periventricular white matter which are consistent 


with chronic small vessel ischemic disease. Unchanged from prior.


Ventricles are in proportion to the degree of atrophy. Mildly increased from 


prior.





ASSESSMENT/PLAN: 


1. Hypoxia/ Pulmonary contusion/pneumonia- setting of a sternal fracture and 

possible aspiration pneumonia


-CXR with increased RLL infiltrate, 


-TMAX overnight  101.2 with no leukocytosis 


-blood culture X2 negative for growth 48 hours. 


-c/w moxifloxacin and Flagyl


-oxygen as needed. 


-Incentive spirometer





2. AMS- alcholol withdrawl vs hepatic encephalopathy vs brain hemorrhage 

secondary from mechanical fall


-mentation not improving follows some command but still somulent during exam. 


-CT HEAD now


-Unlikely wernicke encephalopathy -on thiamine and dextrose








3. Chest wall ecchymosis/sternal fracture- 


-Surgery consulted and recommendated for fracture is pain control and incentive 

spirometer. No surgical intervention at this time. 


-s/p platelets/FFP given ecchymosis - 2 units 


-H/H stable 


-Continue to monitor.





4. Coagulopathy


-secondary to underlying cirrhosis with progressive liver failure


s/p FFP/vitamin K.





5. Alcohol abuse


-will need appropriate counseling upon improvement as patient is altered at this

time. 


-Banana bag. 


-CIWA. 





6. Cirrhosis, with bili 7, INR 1.9, meld score 22, 19.6% mortality rate in the 

next 3 months


-Will need appropriate diuresis, as blood pressure tolerates. 


-Steven Rosario consulted. 


-active alcohol use not a candidate for liver transplant referral


crystal discriminant factor: 75.5 poor prognosis, b/c >32 would benefit from 

methylprednisolone but he is not a candidate for he has underlying 

infection/pneumonia and has the Chest wall ecchymosis/injury with low platelet 

count. Risk outweight the benefit





7. Thrombocytopenia 


-likely secondary to cirrhosis. s/p 1 unit of platelets.





8. History of paroxysmal atrial fibrillation 


-per medical records in 2017 


-was previously on Eliquis. 


-b/c of chest wall ecchymosis there is concern for further bleeding


-anticoagulation held for now.





9. H/o COPD -


-c/w nebs 





10. PT - once more stable will consult 





11. DVT prophylaxis-SCDs





12. Overall prognosis guarded. 





13. CODE STATUS: DNR/DNI.





VS, I&O, 24H, Fishbone


Vital Signs/I&O





Vital Signs








  Date Time  Temp Pulse Resp B/P (MAP) Pulse Ox O2 Delivery O2 Flow Rate FiO2


 


1/22/19 04:00 98.1 97 15 153/83 (106) 96   


 


1/21/19 16:00      Room Air  


 


1/20/19 12:00       1.0 














I&O- Last 24 Hours up to 6 AM 


 


 1/22/19





 06:00


 


Intake Total 870 ml


 


Output Total 2775 ml


 


Balance -1905 ml











Laboratory Data


24H LABS


Laboratory Tests 2


1/21/19 09:52: 


Anion Gap 9, Glomerular Filtration Rate > 60.0, Blood Urea Nitrogen 10, 

Creatinine 0.72, Sodium Level 138, Potassium Level 3.7, Chloride Level 104, 

Carbon Dioxide Level 25, Calcium Level 7.8L, Magnesium Level 1.8


1/22/19 07:41: 


Anion Gap 6L, Glomerular Filtration Rate > 60.0, Blood Urea Nitrogen 8, Creatini

ne 0.60L, Sodium Level 134L, Potassium Level 3.4L, Chloride Level 103, Carbon 

Dioxide Level 25, Calcium Level 7.6L, Magnesium Level 1.7L, Immature Granulocyte

% (Auto) 0.6, White Blood Count 7.2, Red Blood Count 2.13L, Hemoglobin 8.6L, 

Hematocrit 26.3L, Mean Corpuscular Volume 123.5H, Mean Corpuscular Hemoglobin 

40.4H, Mean Corpuscular Hemoglobin Concent 32.7, Red Cell Distribution Width 

16.0H, Platelet Count 56L, Neutrophils (%) (Auto) 71.2H, Lymphocytes (%) (Auto) 

17.0L, Monocytes (%) (Auto) 10.2H, Eosinophils (%) (Auto) 0.6, Basophils (%) 

(Auto) 0.4, Neutrophils # (Auto) 5.2, Lymphocytes # (Auto) 1.2L, Monocytes # 

(Auto) 0.7, Eosinophils # (Auto) 0.0, Basophils # (Auto) 0.0, Nucleated Red 

Blood Cells % (auto) 0.3H, Platelet Estimate MARKED DECREASE, Immature Platelet 

Fraction 3.8, Polychromasia 1+, Anisocytosis 1+, Macrocytosis 3+, Prothrombin 

Time 26.4H, Prothromb Time International Ratio 2.37, Activated Partial 

Thromboplast Time 46.6H, Aspartate Amino Transf (AST/SGOT) 65H, Alanine 

Aminotransferase (ALT/SGPT) 30, Alkaline Phosphatase 98, Total Bilirubin 9.3H, 

Total Protein 6.8, Albumin 2.0L, Ammonia 46H, Albumin/Globulin Ratio 0.42L, 

Vancomycin Level Trough 1.0L


1/22/19 08:50: 


Blood Gas Bicarbonate Standard 24.7, Arterial Blood pH 7.527H, Arterial Blood 

Partial Pressure CO2 27.6L, Arterial Blood Partial Pressure O2 97.7, Arterial 

Blood Total CO2 23.2, Arterial Blood HCO3 22.4, Arterial Blood Base Excess 0.2, 

Arterial Blood Oxygen Saturation 98.1


CBC/BMP


Laboratory Tests


1/21/19 09:52








Calcium Level 7.8 L





1/22/19 07:41








Calcium Level 7.6 L, Red Blood Count 2.13 L, Mean Corpuscular Volume 123.5 H, 

Mean Corpuscular Hemoglobin 40.4 H, Mean Corpuscular Hemoglobin Concent 32.7, 

Red Cell Distribution Width 16.0 H, Neutrophils (%) (Auto) 71.2 H, Lymphocytes 

(%) (Auto) 17.0 L, Monocytes (%) (Auto) 10.2 H, Eosinophils (%) (Auto) 0.6, 

Basophils (%) (Auto) 0.4, Neutrophils # (Auto) 5.2, Lymphocytes # (Auto) 1.2 L, 

Monocytes # (Auto) 0.7, Eosinophils # (Auto) 0.0, Basophils # (Auto) 0.0, 

Aspartate Amino Transf (AST/SGOT) 65 H, Alanine Aminotransferase (ALT/SGPT) 30, 

Alkaline Phosphatase 98, Total Bilirubin 9.3 H, Total Protein 6.8, Albumin 2.0 L


Microbiology





Microbiology


1/20/19 Blood Culture - Preliminary, Resulted


          No Growth after 48 hours. All Specime...


1/20/19 Blood Culture - Preliminary, Resulted


          No Growth after 48 hours. All Specime...





GME ATTESTATION


GME ATTESTATION


My faculty preceptor for this patient encounter was physically present during 

the encounter and was fully available. All aspects of the patient interview, 

examination, medical decision making process, and medical care plan development 

were reviewed and approved by the faculty preceptor. The faculty preceptor is 

aware and concurs with the plan as stated in the body of this note and will 

attest to such by his/her cosignature.











MARIA DE JESUS CHRISTINA DO       Jan 22, 2019 09:38
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
## 2019-01-22 NOTE — REP
CT Head without contrast

 

HISTORY:  Altered mental status

 

COMPARISON: 01/20/2019

 

Areas of decreased attenuation are present in the periventricular and subcortical

white matter.  This represents small-vessel ischemic disease.  There is no

intraparenchymal hemorrhage, acute infarct,  mass or midline shift.  The

ventricular system i and cortical sulci as well as subarachnoid space in the

posterior fossa are dilated consistent with moderate volume loss.  There is no

extra cerebral collection.  There is no fracture.  The visualized sinuses are

clear.

 

IMPRESSION:

1.  Small vessel ischemic disease.

2.  Moderate volume loss.

 

 

Electronically Signed by

Alfonso Melvin MD 01/22/2019 10:14 A

## 2019-01-23 VITALS — DIASTOLIC BLOOD PRESSURE: 71 MMHG | SYSTOLIC BLOOD PRESSURE: 132 MMHG

## 2019-01-23 VITALS — SYSTOLIC BLOOD PRESSURE: 116 MMHG | DIASTOLIC BLOOD PRESSURE: 64 MMHG

## 2019-01-23 VITALS — DIASTOLIC BLOOD PRESSURE: 65 MMHG | SYSTOLIC BLOOD PRESSURE: 98 MMHG

## 2019-01-23 LAB
ALBUMIN SERPL BCG-MCNC: 1.9 GM/DL (ref 3.2–5.2)
ALT SERPL W P-5'-P-CCNC: 25 U/L (ref 12–78)
ANISOCYTOSIS BLD QL SMEAR: (no result)
APTT BLD: 45.9 SECONDS (ref 25.4–37.6)
BASOPHILS # BLD AUTO: 0 10^3/UL (ref 0–0.2)
BASOPHILS NFR BLD AUTO: 0.5 % (ref 0–1)
BILIRUB SERPL-MCNC: 7.9 MG/DL (ref 0.2–1)
BUN SERPL-MCNC: 10 MG/DL (ref 7–18)
CALCIUM SERPL-MCNC: 7.8 MG/DL (ref 8.5–10.1)
CHLORIDE SERPL-SCNC: 103 MEQ/L (ref 98–107)
CO2 SERPL-SCNC: 25 MEQ/L (ref 21–32)
CREAT SERPL-MCNC: 0.68 MG/DL (ref 0.7–1.3)
EOSINOPHIL # BLD AUTO: 0.1 10^3/UL (ref 0–0.5)
EOSINOPHIL NFR BLD AUTO: 1.6 % (ref 0–3)
GFR SERPL CREATININE-BSD FRML MDRD: > 60 ML/MIN/{1.73_M2} (ref 56–?)
GLUCOSE SERPL-MCNC: 91 MG/DL (ref 70–100)
HCT VFR BLD AUTO: 24.2 % (ref 42–52)
HGB BLD-MCNC: 8 G/DL (ref 13.5–17.5)
INR PPP: 2.41
LYMPHOCYTES # BLD AUTO: 1.5 10^3/UL (ref 1.5–4.5)
LYMPHOCYTES NFR BLD AUTO: 24.9 % (ref 24–44)
MACROCYTES BLD QL SMEAR: (no result)
MAGNESIUM SERPL-MCNC: 1.7 MG/DL (ref 1.8–2.4)
MCH RBC QN AUTO: 40.6 PG (ref 27–33)
MCHC RBC AUTO-ENTMCNC: 33.1 G/DL (ref 32–36.5)
MCV RBC AUTO: 122.8 FL (ref 80–96)
MONOCYTES # BLD AUTO: 0.8 10^3/UL (ref 0–0.8)
MONOCYTES NFR BLD AUTO: 13.3 % (ref 0–5)
NEUTROPHILS # BLD AUTO: 3.7 10^3/UL (ref 1.8–7.7)
NEUTROPHILS NFR BLD AUTO: 59.4 % (ref 36–66)
PLATELET # BLD AUTO: 57 10^3/UL (ref 150–450)
PLATELET BLD QL SMEAR: (no result)
POTASSIUM SERPL-SCNC: 3.2 MEQ/L (ref 3.5–5.1)
PROT SERPL-MCNC: 6.3 GM/DL (ref 6.4–8.2)
PROTHROMBIN TIME: 26.7 SECONDS (ref 12.1–14.4)
RBC # BLD AUTO: 1.97 10^6/UL (ref 4.3–6.1)
SODIUM SERPL-SCNC: 135 MEQ/L (ref 136–145)
VIT B12 SERPL-MCNC: 1778 PG/ML
WBC # BLD AUTO: 6.2 10^3/UL (ref 4–10)

## 2019-01-23 RX ADMIN — DEXTROSE MONOHYDRATE SCH MLS/HR: 50 INJECTION, SOLUTION INTRAVENOUS at 08:41

## 2019-01-23 RX ADMIN — METRONIDAZOLE SCH MLS/HR: 500 INJECTION, SOLUTION INTRAVENOUS at 08:40

## 2019-01-23 RX ADMIN — LACTULOSE SCH ML: 10 SOLUTION ORAL at 23:34

## 2019-01-23 RX ADMIN — SODIUM CHLORIDE, PRESERVATIVE FREE SCH ML: 5 INJECTION INTRAVENOUS at 14:54

## 2019-01-23 RX ADMIN — LACTULOSE SCH ML: 10 SOLUTION ORAL at 18:28

## 2019-01-23 RX ADMIN — IPRATROPIUM BROMIDE AND ALBUTEROL SULFATE SCH ML: .5; 3 SOLUTION RESPIRATORY (INHALATION) at 07:56

## 2019-01-23 RX ADMIN — OXAZEPAM SCH MG: 10 CAPSULE, GELATIN COATED ORAL at 00:08

## 2019-01-23 RX ADMIN — FUROSEMIDE SCH MG: 10 INJECTION, SOLUTION INTRAMUSCULAR; INTRAVENOUS at 08:39

## 2019-01-23 RX ADMIN — SODIUM CHLORIDE, PRESERVATIVE FREE SCH ML: 5 INJECTION INTRAVENOUS at 23:37

## 2019-01-23 RX ADMIN — IPRATROPIUM BROMIDE AND ALBUTEROL SULFATE SCH ML: .5; 3 SOLUTION RESPIRATORY (INHALATION) at 15:35

## 2019-01-23 RX ADMIN — MINERAL OIL AND PETROLATUM SCH DOSE: 150; 830 OINTMENT OPHTHALMIC at 16:00

## 2019-01-23 RX ADMIN — PHYTONADIONE SCH MG: 5 TABLET ORAL at 08:39

## 2019-01-23 RX ADMIN — LACTULOSE SCH ML: 10 SOLUTION ORAL at 12:44

## 2019-01-23 RX ADMIN — LACTULOSE SCH ML: 10 SOLUTION ORAL at 05:28

## 2019-01-23 RX ADMIN — FOLIC ACID SCH MG: 1 TABLET ORAL at 08:39

## 2019-01-23 RX ADMIN — OXAZEPAM SCH MG: 10 CAPSULE, GELATIN COATED ORAL at 23:35

## 2019-01-23 RX ADMIN — NICOTINE SCH PATCH: 21 PATCH, EXTENDED RELEASE TRANSDERMAL at 08:57

## 2019-01-23 RX ADMIN — MINERAL OIL AND PETROLATUM SCH DOSE: 150; 830 OINTMENT OPHTHALMIC at 21:00

## 2019-01-23 RX ADMIN — IPRATROPIUM BROMIDE AND ALBUTEROL SULFATE SCH ML: .5; 3 SOLUTION RESPIRATORY (INHALATION) at 20:00

## 2019-01-23 RX ADMIN — Medication SCH MLS/HR: at 08:57

## 2019-01-23 RX ADMIN — SODIUM CHLORIDE, PRESERVATIVE FREE SCH ML: 5 INJECTION INTRAVENOUS at 05:28

## 2019-01-23 RX ADMIN — POTASSIUM CHLORIDE SCH MEQ: 750 TABLET, EXTENDED RELEASE ORAL at 08:57

## 2019-01-23 RX ADMIN — LORAZEPAM PRN MG: 2 INJECTION INTRAMUSCULAR; INTRAVENOUS at 18:29

## 2019-01-23 RX ADMIN — OXAZEPAM SCH MG: 10 CAPSULE, GELATIN COATED ORAL at 08:39

## 2019-01-23 RX ADMIN — MINERAL OIL AND PETROLATUM SCH DOSE: 150; 830 OINTMENT OPHTHALMIC at 08:40

## 2019-01-23 RX ADMIN — MULTIPLE VITAMINS W/ MINERALS TAB SCH TAB: TAB at 08:40

## 2019-01-23 RX ADMIN — METRONIDAZOLE SCH MLS/HR: 500 INJECTION, SOLUTION INTRAVENOUS at 00:08

## 2019-01-23 RX ADMIN — Medication SCH MG: at 08:39

## 2019-01-23 RX ADMIN — IPRATROPIUM BROMIDE AND ALBUTEROL SULFATE SCH ML: .5; 3 SOLUTION RESPIRATORY (INHALATION) at 11:30

## 2019-01-23 RX ADMIN — LACTULOSE SCH ML: 10 SOLUTION ORAL at 00:08
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
## 2019-01-24 VITALS — SYSTOLIC BLOOD PRESSURE: 127 MMHG | DIASTOLIC BLOOD PRESSURE: 76 MMHG

## 2019-01-24 VITALS — SYSTOLIC BLOOD PRESSURE: 119 MMHG | DIASTOLIC BLOOD PRESSURE: 64 MMHG

## 2019-01-24 LAB
ALBUMIN SERPL BCG-MCNC: 2.1 GM/DL (ref 3.2–5.2)
ALT SERPL W P-5'-P-CCNC: 27 U/L (ref 12–78)
APTT BLD: 43.4 SECONDS (ref 25.4–37.6)
BASOPHILS # BLD AUTO: 0 10^3/UL (ref 0–0.2)
BASOPHILS NFR BLD AUTO: 0.7 % (ref 0–1)
BILIRUB SERPL-MCNC: 7 MG/DL (ref 0.2–1)
BUN SERPL-MCNC: 10 MG/DL (ref 7–18)
CALCIUM SERPL-MCNC: 8.5 MG/DL (ref 8.5–10.1)
CHLORIDE SERPL-SCNC: 106 MEQ/L (ref 98–107)
CO2 SERPL-SCNC: 26 MEQ/L (ref 21–32)
CREAT SERPL-MCNC: 0.73 MG/DL (ref 0.7–1.3)
EOSINOPHIL # BLD AUTO: 0.1 10^3/UL (ref 0–0.5)
EOSINOPHIL NFR BLD AUTO: 1.8 % (ref 0–3)
GFR SERPL CREATININE-BSD FRML MDRD: > 60 ML/MIN/{1.73_M2} (ref 56–?)
GLUCOSE SERPL-MCNC: 110 MG/DL (ref 70–100)
HCT VFR BLD AUTO: 29 % (ref 42–52)
HGB BLD-MCNC: 9.6 G/DL (ref 13.5–17.5)
INR PPP: 2
LYMPHOCYTES # BLD AUTO: 0.9 10^3/UL (ref 1.5–4.5)
LYMPHOCYTES NFR BLD AUTO: 20.8 % (ref 24–44)
MACROCYTES BLD QL SMEAR: (no result)
MAGNESIUM SERPL-MCNC: 2 MG/DL (ref 1.8–2.4)
MCH RBC QN AUTO: 41.7 PG (ref 27–33)
MCHC RBC AUTO-ENTMCNC: 33.1 G/DL (ref 32–36.5)
MCV RBC AUTO: 126.1 FL (ref 80–96)
MONOCYTES # BLD AUTO: 0.8 10^3/UL (ref 0–0.8)
MONOCYTES NFR BLD AUTO: 17.6 % (ref 0–5)
NEUTROPHILS # BLD AUTO: 2.6 10^3/UL (ref 1.8–7.7)
NEUTROPHILS NFR BLD AUTO: 58.6 % (ref 36–66)
PLATELET # BLD AUTO: 58 10^3/UL (ref 150–450)
PLATELET BLD QL SMEAR: (no result)
POLYCHROMASIA BLD QL SMEAR: (no result)
POTASSIUM SERPL-SCNC: 3.3 MEQ/L (ref 3.5–5.1)
PROT SERPL-MCNC: 7 GM/DL (ref 6.4–8.2)
PROTHROMBIN TIME: 23.1 SECONDS (ref 12.1–14.4)
RBC # BLD AUTO: 2.3 10^6/UL (ref 4.3–6.1)
SODIUM SERPL-SCNC: 141 MEQ/L (ref 136–145)
WBC # BLD AUTO: 4.4 10^3/UL (ref 4–10)

## 2019-01-24 RX ADMIN — IPRATROPIUM BROMIDE AND ALBUTEROL SULFATE SCH ML: .5; 3 SOLUTION RESPIRATORY (INHALATION) at 15:38

## 2019-01-24 RX ADMIN — MINERAL OIL AND PETROLATUM SCH DOSE: 150; 830 OINTMENT OPHTHALMIC at 16:00

## 2019-01-24 RX ADMIN — Medication SCH MLS/HR: at 08:18

## 2019-01-24 RX ADMIN — SODIUM CHLORIDE, PRESERVATIVE FREE SCH ML: 5 INJECTION INTRAVENOUS at 23:03

## 2019-01-24 RX ADMIN — LACTULOSE SCH ML: 10 SOLUTION ORAL at 16:27

## 2019-01-24 RX ADMIN — LACTULOSE SCH ML: 10 SOLUTION ORAL at 06:12

## 2019-01-24 RX ADMIN — MULTIPLE VITAMINS W/ MINERALS TAB SCH TAB: TAB at 08:19

## 2019-01-24 RX ADMIN — OXAZEPAM SCH MG: 10 CAPSULE, GELATIN COATED ORAL at 08:19

## 2019-01-24 RX ADMIN — PHYTONADIONE SCH MG: 5 TABLET ORAL at 08:18

## 2019-01-24 RX ADMIN — MINERAL OIL AND PETROLATUM SCH DOSE: 150; 830 OINTMENT OPHTHALMIC at 08:19

## 2019-01-24 RX ADMIN — LACTULOSE SCH ML: 10 SOLUTION ORAL at 23:04

## 2019-01-24 RX ADMIN — MINERAL OIL AND PETROLATUM SCH DOSE: 150; 830 OINTMENT OPHTHALMIC at 23:03

## 2019-01-24 RX ADMIN — FOLIC ACID SCH MG: 1 TABLET ORAL at 08:19

## 2019-01-24 RX ADMIN — SODIUM CHLORIDE, PRESERVATIVE FREE SCH ML: 5 INJECTION INTRAVENOUS at 06:12

## 2019-01-24 RX ADMIN — LORAZEPAM PRN MG: 2 INJECTION INTRAMUSCULAR; INTRAVENOUS at 16:49

## 2019-01-24 RX ADMIN — IPRATROPIUM BROMIDE AND ALBUTEROL SULFATE SCH ML: .5; 3 SOLUTION RESPIRATORY (INHALATION) at 11:35

## 2019-01-24 RX ADMIN — IPRATROPIUM BROMIDE AND ALBUTEROL SULFATE SCH ML: .5; 3 SOLUTION RESPIRATORY (INHALATION) at 19:40

## 2019-01-24 RX ADMIN — NICOTINE SCH PATCH: 21 PATCH, EXTENDED RELEASE TRANSDERMAL at 08:18

## 2019-01-24 RX ADMIN — SODIUM CHLORIDE, PRESERVATIVE FREE SCH ML: 5 INJECTION INTRAVENOUS at 13:55

## 2019-01-24 RX ADMIN — POTASSIUM CHLORIDE SCH MEQ: 750 TABLET, EXTENDED RELEASE ORAL at 08:19

## 2019-01-24 RX ADMIN — LACTULOSE SCH ML: 10 SOLUTION ORAL at 10:23

## 2019-01-24 RX ADMIN — OXAZEPAM SCH MG: 10 CAPSULE, GELATIN COATED ORAL at 23:03

## 2019-01-24 RX ADMIN — FUROSEMIDE SCH MG: 10 INJECTION, SOLUTION INTRAMUSCULAR; INTRAVENOUS at 08:18

## 2019-01-24 RX ADMIN — IPRATROPIUM BROMIDE AND ALBUTEROL SULFATE SCH ML: .5; 3 SOLUTION RESPIRATORY (INHALATION) at 07:05
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
## 2019-01-24 NOTE — NUR
PO trials of pureed solids and honey thick liquids were successful. Recommending diet 
upgrade to puree & honey thick liquids. Please position in full upright position and assist 
w/ all PO intake by spoon. These findings were reported to MADELYN Stovall.



Pt tolerated these consistencies w/o s/sx aspiration or penetration. Pt demonstrates 
voluntary swallow & cough. Pt has been managing his own secretions w/o suction. Dentures 
were in place for intake.

-------------------------------------------------------------------------------

Addendum: 01/24/19 at [address] [facility] SUZANNE

-------------------------------------------------------------------------------

Amended: Links added.

## 2019-01-25 VITALS — DIASTOLIC BLOOD PRESSURE: 68 MMHG | SYSTOLIC BLOOD PRESSURE: 121 MMHG

## 2019-01-25 VITALS — DIASTOLIC BLOOD PRESSURE: 71 MMHG | SYSTOLIC BLOOD PRESSURE: 119 MMHG

## 2019-01-25 LAB
ALBUMIN SERPL BCG-MCNC: 1.9 GM/DL (ref 3.2–5.2)
ALT SERPL W P-5'-P-CCNC: 23 U/L (ref 12–78)
ANISOCYTOSIS BLD QL SMEAR: (no result)
APTT BLD: 46.1 SECONDS (ref 25.4–37.6)
BASOPHILS # BLD AUTO: 0 10^3/UL (ref 0–0.2)
BASOPHILS NFR BLD AUTO: 0.6 % (ref 0–1)
BILIRUB SERPL-MCNC: 6.2 MG/DL (ref 0.2–1)
BUN SERPL-MCNC: 11 MG/DL (ref 7–18)
CALCIUM SERPL-MCNC: 8.2 MG/DL (ref 8.5–10.1)
CHLORIDE SERPL-SCNC: 110 MEQ/L (ref 98–107)
CO2 SERPL-SCNC: 27 MEQ/L (ref 21–32)
CREAT SERPL-MCNC: 0.69 MG/DL (ref 0.7–1.3)
EOSINOPHIL # BLD AUTO: 0.1 10^3/UL (ref 0–0.5)
EOSINOPHIL NFR BLD AUTO: 1.7 % (ref 0–3)
GFR SERPL CREATININE-BSD FRML MDRD: > 60 ML/MIN/{1.73_M2} (ref 56–?)
GLUCOSE SERPL-MCNC: 116 MG/DL (ref 70–100)
HCT VFR BLD AUTO: 27.9 % (ref 42–52)
HGB BLD-MCNC: 9.1 G/DL (ref 13.5–17.5)
INR PPP: 2.14
LYMPHOCYTES # BLD AUTO: 1.1 10^3/UL (ref 1.5–4.5)
LYMPHOCYTES NFR BLD AUTO: 20.4 % (ref 24–44)
MACROCYTES BLD QL SMEAR: (no result)
MAGNESIUM SERPL-MCNC: 1.9 MG/DL (ref 1.8–2.4)
MCH RBC QN AUTO: 41.7 PG (ref 27–33)
MCHC RBC AUTO-ENTMCNC: 32.6 G/DL (ref 32–36.5)
MCV RBC AUTO: 128 FL (ref 80–96)
MONOCYTES # BLD AUTO: 1.2 10^3/UL (ref 0–0.8)
MONOCYTES NFR BLD AUTO: 23.1 % (ref 0–5)
NEUTROPHILS # BLD AUTO: 2.8 10^3/UL (ref 1.8–7.7)
NEUTROPHILS NFR BLD AUTO: 53.6 % (ref 36–66)
PLATELET # BLD AUTO: 54 10^3/UL (ref 150–450)
PLATELET BLD QL SMEAR: (no result)
POTASSIUM SERPL-SCNC: 3.5 MEQ/L (ref 3.5–5.1)
PROT SERPL-MCNC: 6.6 GM/DL (ref 6.4–8.2)
PROTHROMBIN TIME: 24.3 SECONDS (ref 12.1–14.4)
RBC # BLD AUTO: 2.18 10^6/UL (ref 4.3–6.1)
SODIUM SERPL-SCNC: 143 MEQ/L (ref 136–145)
WBC # BLD AUTO: 5.2 10^3/UL (ref 4–10)

## 2019-01-25 RX ADMIN — SODIUM CHLORIDE, PRESERVATIVE FREE SCH ML: 5 INJECTION INTRAVENOUS at 13:35

## 2019-01-25 RX ADMIN — IPRATROPIUM BROMIDE AND ALBUTEROL SULFATE SCH ML: .5; 3 SOLUTION RESPIRATORY (INHALATION) at 15:54

## 2019-01-25 RX ADMIN — MINERAL OIL AND PETROLATUM SCH DOSE: 150; 830 OINTMENT OPHTHALMIC at 16:00

## 2019-01-25 RX ADMIN — MULTIPLE VITAMINS W/ MINERALS TAB SCH TAB: TAB at 08:54

## 2019-01-25 RX ADMIN — Medication SCH MLS/HR: at 08:55

## 2019-01-25 RX ADMIN — MINERAL OIL AND PETROLATUM SCH DOSE: 150; 830 OINTMENT OPHTHALMIC at 20:08

## 2019-01-25 RX ADMIN — IPRATROPIUM BROMIDE AND ALBUTEROL SULFATE SCH ML: .5; 3 SOLUTION RESPIRATORY (INHALATION) at 07:17

## 2019-01-25 RX ADMIN — LACTULOSE SCH ML: 10 SOLUTION ORAL at 18:18

## 2019-01-25 RX ADMIN — PHYTONADIONE SCH MG: 5 TABLET ORAL at 08:54

## 2019-01-25 RX ADMIN — SODIUM CHLORIDE, PRESERVATIVE FREE SCH ML: 5 INJECTION INTRAVENOUS at 05:45

## 2019-01-25 RX ADMIN — LACTULOSE SCH ML: 10 SOLUTION ORAL at 23:56

## 2019-01-25 RX ADMIN — IPRATROPIUM BROMIDE AND ALBUTEROL SULFATE SCH ML: .5; 3 SOLUTION RESPIRATORY (INHALATION) at 11:09

## 2019-01-25 RX ADMIN — MINERAL OIL AND PETROLATUM SCH DOSE: 150; 830 OINTMENT OPHTHALMIC at 08:55

## 2019-01-25 RX ADMIN — FOLIC ACID SCH MG: 1 TABLET ORAL at 08:54

## 2019-01-25 RX ADMIN — SODIUM CHLORIDE, PRESERVATIVE FREE SCH ML: 5 INJECTION INTRAVENOUS at 20:08

## 2019-01-25 RX ADMIN — LACTULOSE SCH ML: 10 SOLUTION ORAL at 05:45

## 2019-01-25 RX ADMIN — POTASSIUM CHLORIDE SCH MEQ: 750 TABLET, EXTENDED RELEASE ORAL at 08:54

## 2019-01-25 RX ADMIN — FUROSEMIDE SCH MG: 10 INJECTION, SOLUTION INTRAMUSCULAR; INTRAVENOUS at 08:54

## 2019-01-25 RX ADMIN — LORAZEPAM PRN MG: 2 INJECTION INTRAMUSCULAR; INTRAVENOUS at 21:37

## 2019-01-25 RX ADMIN — OXAZEPAM SCH MG: 10 CAPSULE, GELATIN COATED ORAL at 08:54

## 2019-01-25 RX ADMIN — OXAZEPAM SCH MG: 10 CAPSULE, GELATIN COATED ORAL at 20:08

## 2019-01-25 RX ADMIN — IPRATROPIUM BROMIDE AND ALBUTEROL SULFATE SCH ML: .5; 3 SOLUTION RESPIRATORY (INHALATION) at 20:18

## 2019-01-25 RX ADMIN — LACTULOSE SCH ML: 10 SOLUTION ORAL at 12:25

## 2019-01-25 RX ADMIN — NICOTINE SCH PATCH: 21 PATCH, EXTENDED RELEASE TRANSDERMAL at 08:53

## 2019-01-25 NOTE — IPNPDOC
Date Seen


The patient was seen on 1/25/19.





Progress Note


Subjective: This morning the patient believes that these and got another 

hospital that the month is February and he was unable to tell me what year. He 

is a little more communicative this morning but then is just not willing to 

participate in the questioning. Per nursing, in the evening patient became 

agitated and was trying to get out of bed to go to the fridge to get alcohol. 

Was difficult to redirect him so he was given 1 mg of Ativan IV. And his Serax 

was restarted. The patient was able to eat 25% of his dinner last night but then

he spat some of it out and started to chew on his spoon. Speech therapy 

attempted to feed him this morning and he ate 50% of his food with no problems. 

Will attempt to feed him again this afternoon and if he's able to eat more than 

50% of his food we will discontinue his NG tube and tube feeds. His tube feeds 

did come out earlier this morning. If tolerated we will continue with the pured

diet. There is no other overnight events reported by nursing. 





PHYSICAL EXAMINATION:


Vitals: (see below) 


General: No acute distress, laying comfortably in bed.


HEENT: Moist mucous membranes. Bruising over the right orbital region. eyes 

closed, sclera incterus. 


Neck: No JVD or lymphadenopathy


Cardiac: RRR, No murmurs. Significant ecchymosis on the chest wall. Tender to 

palpation no crepitus appreciated.


Pulm: Clear to auscultation b/l. No wheezing, rhonchi


Abd: NT/mildly distended but soft. + BS


Ext: 2+ non-pitting edema bilateral lower extremities. No cyanosis.


Neuro: Tremulous 


Skin: Jaundice





LABORATORY DATA: See below.





IMAGING: 


CT chest on 1/20/19


IMPRESSION: 


Ground glass opacities in the right middle lobe. Pneumonia versus contusion.


Opacification of the right lower lobe bronchi. Bronchial mucous plugging in the 


left lower lobe. 


Emphysematous lung disease.


Acute mildly displaced fracture of the sternal body. Early with history.





CT abdomen and pelvis on 1/20/19


IMPRESSION: 


Mildly lobulated contour of the liver. Possible mild cirrhosis.


Fatty infiltration of the liver. 


Cholelithiasis without acute cholecystitis.


Copious stool throughout the colon.


Small supraumbilical hernia containing loops of small bowel. No evidence of 


bowel obstruction.





CT head on 1/20/19


IMPRESSION: 


No acute intracranial hemorrhage.


Mild hypodensities in the periventricular white matter which are consistent 


with chronic small vessel ischemic disease. Unchanged from prior.


Ventricles are in proportion to the degree of atrophy. Mildly increased from 


prior.





ASSESSMENT/PLAN: 


1. Hypoxia/ Pulmonary contusion/pneumonia- setting of a sternal fracture and 

possible aspiration pneumonia


-CXR with increased RLL infiltrate, 


Afebrile with no leukocytosis


-blood culture X2 negative for growth 48 hours. 


-c/w moxifloxacin for 7 days total of antimicrobial therapy to cover for 

aspiration pneumonia


-oxygen as needed. 


-Incentive spirometer





2. AMS- alcholol withdrawl vs hepatic encephalopathy unlikely brain hemorrhage 

secondary from mechanical fall


-mentation not improving follows some command but still somulent during exam. 


-CT HEAD 01/20/2019: Negative for any acute changes.


-Unlikely wernicke encephalopathy -on thiamine and dextrose


-Ammonia elevated continue with enmanuel lactulose 30ML Q6H.


-Is tolerating his pured honey thickened diet. If patient is able to eat 50% of

his food during lunch we will discontinue the NG and tube feeds.


c/w Serax








3. Chest wall ecchymosis/sternal fracture- 


-Surgery consulted and recommendated for fracture is pain control and incentive 

spirometer. No surgical intervention at this time. 


-s/p platelets/FFP given ecchymosis - 2 units 


-H/H stable 


-Continue to monitor.





4. Coagulopathy


-secondary to underlying cirrhosis with progressive liver failure


-worsening INR while on Vitamin K. Will reach out to GI, to see if further 

recommendation. This could be since of worsening liver function and prognosis is

guarded. 


s/p FFP/vitamin K.





5. Alcohol abuse


-will need appropriate counseling upon improvement as patient is altered at this

time. 


-Banana bag. 


-CIWA. 





6. Cirrhosis, with bili 7, INR 1.9, meld score 22, 19.6% mortality rate in the 

next 3 months


-Will need appropriate diuresis, as blood pressure tolerates. 


-Steven Rosario consulted. 


-active alcohol use not a candidate for liver transplant referral


-crystal discriminant factor: 75.5 poor prognosis, b/c >32 would benefit from 

methylprednisolone but he is not a candidate for he has underlying 

infection/pneumonia and has the Chest wall ecchymosis/injury with low platelet 

count. Risk outweight the benefit





7. Thrombocytopenia 


-likely secondary to cirrhosis. s/p 1 unit of platelets.





8. History of paroxysmal atrial fibrillation 


-per medical records in 2017 


-was previously on Eliquis. 


-b/c of chest wall ecchymosis there is concern for further bleeding


-anticoagulation held for now.





9. H/o COPD -


-c/w nebs 





10. PT - once more stable will consult 





11. DVT prophylaxis-SCDs





12. Overall prognosis guarded. 





13. Space therapy recommended by mouth pured solids and honey thickened 

liquids. Have tolerating more than 50% of his meal we will stop the tube feeds.





13. CODE STATUS: DNR/DNI.





VS, I&O, 24H, [facility]


Vital Signs/I&O





Vital Signs








  Date Time  Temp Pulse Resp B/P (MAP) Pulse Ox O2 Delivery O2 Flow Rate FiO2


 


1/24/19 22:00 99.2 94 20 119/64 (82) 96 Room Air  


 


1/20/19 12:00       1.0 














I&O- Last 24 Hours up to 6 AM 


 


 1/25/19





 06:00


 


Intake Total 1540 ml


 


Output Total 0 ml


 


Balance 1540 ml











Laboratory Data


24H LABS


Laboratory Tests 2


1/24/19 09:42: 


Immature Granulocyte % (Auto) 0.5, White Blood Count 4.4, Red Blood Count 2.30L,

Hemoglobin 9.6L, Hematocrit 29.0L, Mean Corpuscular Volume 126.1H, Mean 

Corpuscular Hemoglobin 41.7H, Mean Corpuscular Hemoglobin Concent 33.1, Red Cell

Distribution Width 15.8H, Platelet Count 58L, Neutrophils (%) (Auto) 58.6, 

Lymphocytes (%) (Auto) 20.8L, Monocytes (%) (Auto) 17.6H, Eosinophils (%) (Auto)

1.8, Basophils (%) (Auto) 0.7, Neutrophils # (Auto) 2.6, Lymphocytes # (Auto) 

0.9L, Monocytes # (Auto) 0.8, Eosinophils # (Auto) 0.1, Basophils # (Auto) 0.0, 

Nucleated Red Blood Cells % (auto) 0.0, Platelet Estimate MARKED DECREASE, Giant

Platelets , Immature Platelet Fraction 3.1, Polychromasia 1+, Macrocytosis 3+, 

Prothrombin Time 23.1H, Prothromb Time International Ratio 2.00, Activated 

Partial Thromboplast Time 43.4H, Anion Gap 9, Glomerular Filtration Rate > 60.0,

Blood Urea Nitrogen 10, Creatinine 0.73, Sodium Level 141, Potassium Level 3.3L,

Chloride Level 106, Carbon Dioxide Level 26, Calcium Level 8.5, Aspartate Amino 

Transf (AST/SGOT) 46H, Alanine Aminotransferase (ALT/SGPT) 27, Alkaline 

Phosphatase 187H, Total Bilirubin 7.0H, Total Protein 7.0, Albumin 2.1L, 

Magnesium Level 2.0, Ammonia 26, Albumin/Globulin Ratio 0.43L


1/25/19 05:19: 


Immature Granulocyte % (Auto) 0.6, White Blood Count 5.2, Red Blood Count 2.18L,

Hemoglobin 9.1L, Hematocrit 27.9L, Mean Corpuscular Volume 128.0H, Mean 

Corpuscular Hemoglobin 41.7H, Mean Corpuscular Hemoglobin Concent 32.6, Red Cell

Distribution Width 15.8H, Platelet Count 54L, Neutrophils (%) (Auto) 53.6, 

Lymphocytes (%) (Auto) 20.4L, Monocytes (%) (Auto) 23.1H, Eosinophils (%) (Auto)

1.7, Basophils (%) (Auto) 0.6, Neutrophils # (Auto) 2.8, Lymphocytes # (Auto) 

1.1L, Monocytes # (Auto) 1.2H, Eosinophils # (Auto) 0.1, Basophils # (Auto) 0.0,

Nucleated Red Blood Cells % (auto) 0.4H, Platelet Estimate MARKED DECREASE, 

Macrocytosis 4+, Prothrombin Time 24.3H, Prothromb Time International Ratio 

2.14, Activated Partial Thromboplast Time 46.1H, Anion Gap 6L, Glomerular 

Filtration Rate > 60.0, Blood Urea Nitrogen 11, Creatinine 0.69L, Sodium Level 

143, Potassium Level 3.5, Chloride Level 110H, Carbon Dioxide Level 27, Calcium 

Level 8.2L, Aspartate Amino Transf (AST/SGOT) 39H, Alanine Aminotransferase 

(ALT/SGPT) 23, Alkaline Phosphatase 198H, Total Bilirubin 6.2H, Total Protein 

6.6, Albumin 1.9L, Magnesium Level 1.9, Ammonia 23, Albumin/Globulin Ratio 

0.40L, Anisocytosis 1+


CBC/BMP


Laboratory Tests


1/24/19 09:42








Red Blood Count 2.30 L, Mean Corpuscular Volume 126.1 H, Mean Corpuscular 

Hemoglobin 41.7 H, Mean Corpuscular Hemoglobin Concent 33.1, Red Cell 

Distribution Width 15.8 H, Neutrophils (%) (Auto) 58.6, Lymphocytes (%) (Auto) 

20.8 L, Monocytes (%) (Auto) 17.6 H, Eosinophils (%) (Auto) 1.8, Basophils (%) 

(Auto) 0.7, Neutrophils # (Auto) 2.6, Lymphocytes # (Auto) 0.9 L, Monocytes # 

(Auto) 0.8, Eosinophils # (Auto) 0.1, Basophils # (Auto) 0.0, Calcium Level 8.5,

Aspartate Amino Transf (AST/SGOT) 46 H, Alanine Aminotransferase (ALT/SGPT) 27, 

Alkaline Phosphatase 187 H, Total Bilirubin 7.0 H, Total Protein 7.0, Albumin 

2.1 L





1/25/19 05:19








Red Blood Count 2.18 L, Mean Corpuscular Volume 128.0 H, Mean Corpuscular 

Hemoglobin 41.7 H, Mean Corpuscular Hemoglobin Concent 32.6, Red Cell 

Distribution Width 15.8 H, Neutrophils (%) (Auto) 53.6, Lymphocytes (%) (Auto) 

20.4 L, Monocytes (%) (Auto) 23.1 H, Eosinophils (%) (Auto) 1.7, Basophils (%) 

(Auto) 0.6, Neutrophils # (Auto) 2.8, Lymphocytes # (Auto) 1.1 L, Monocytes # 

(Auto) 1.2 H, Eosinophils # (Auto) 0.1, Basophils # (Auto) 0.0, Calcium Level 

8.2 L, Aspartate Amino Transf (AST/SGOT) 39 H, Alanine Aminotransferase 

(ALT/SGPT) 23, Alkaline Phosphatase 198 H, Total Bilirubin 6.2 H, Total Protein 

6.6, Albumin 1.9 L


Microbiology





Microbiology


1/20/19 Blood Culture - Preliminary, Resulted


          No Growth after 72 hours. All specime...


1/20/19 Blood Culture - Preliminary, Resulted


          No Growth after 72 hours. All specime...





GME ATTESTATION


GME ATTESTATION


My faculty preceptor for this patient encounter was physically present during 

the encounter and was fully available. All aspects of the patient interview, 

examination, medical decision making process, and medical care plan development 

were reviewed and approved by the faculty preceptor. The faculty preceptor is 

aware and concurs with the plan as stated in the body of this note and will 

attest to such by his/her cosignature.











MARIA DE JESUS CHRISTINA DO       Jan 25, 2019 08:22

## 2019-01-26 VITALS — SYSTOLIC BLOOD PRESSURE: 128 MMHG | DIASTOLIC BLOOD PRESSURE: 68 MMHG

## 2019-01-26 VITALS — DIASTOLIC BLOOD PRESSURE: 75 MMHG | SYSTOLIC BLOOD PRESSURE: 122 MMHG

## 2019-01-26 VITALS — DIASTOLIC BLOOD PRESSURE: 74 MMHG | SYSTOLIC BLOOD PRESSURE: 129 MMHG

## 2019-01-26 VITALS — SYSTOLIC BLOOD PRESSURE: 132 MMHG | DIASTOLIC BLOOD PRESSURE: 64 MMHG

## 2019-01-26 LAB
ALBUMIN SERPL BCG-MCNC: 2.2 GM/DL (ref 3.2–5.2)
ALT SERPL W P-5'-P-CCNC: 24 U/L (ref 12–78)
ANISOCYTOSIS BLD QL SMEAR: (no result)
APTT BLD: 46.2 SECONDS (ref 25.4–37.6)
BASOPHILS # BLD AUTO: 0 10^3/UL (ref 0–0.2)
BASOPHILS NFR BLD AUTO: 0.6 % (ref 0–1)
BILIRUB SERPL-MCNC: 7.6 MG/DL (ref 0.2–1)
BUN SERPL-MCNC: 15 MG/DL (ref 7–18)
CALCIUM SERPL-MCNC: 8.4 MG/DL (ref 8.5–10.1)
CHLORIDE SERPL-SCNC: 113 MEQ/L (ref 98–107)
CO2 SERPL-SCNC: 26 MEQ/L (ref 21–32)
CREAT SERPL-MCNC: 0.81 MG/DL (ref 0.7–1.3)
EOSINOPHIL # BLD AUTO: 0.1 10^3/UL (ref 0–0.5)
EOSINOPHIL NFR BLD AUTO: 1.9 % (ref 0–3)
GFR SERPL CREATININE-BSD FRML MDRD: > 60 ML/MIN/{1.73_M2} (ref 56–?)
GLUCOSE SERPL-MCNC: 85 MG/DL (ref 70–100)
HCT VFR BLD AUTO: 30.6 % (ref 42–52)
HGB BLD-MCNC: 9.8 G/DL (ref 13.5–17.5)
INR PPP: 2
LYMPHOCYTES # BLD AUTO: 1.5 10^3/UL (ref 1.5–4.5)
LYMPHOCYTES NFR BLD AUTO: 24 % (ref 24–44)
MACROCYTES BLD QL SMEAR: (no result)
MAGNESIUM SERPL-MCNC: 1.9 MG/DL (ref 1.8–2.4)
MCH RBC QN AUTO: 42.1 PG (ref 27–33)
MCHC RBC AUTO-ENTMCNC: 32 G/DL (ref 32–36.5)
MCV RBC AUTO: 131.3 FL (ref 80–96)
MONOCYTES # BLD AUTO: 1.5 10^3/UL (ref 0–0.8)
MONOCYTES NFR BLD AUTO: 23.7 % (ref 0–5)
NEUTROPHILS # BLD AUTO: 3.1 10^3/UL (ref 1.8–7.7)
NEUTROPHILS NFR BLD AUTO: 49.3 % (ref 36–66)
PLATELET # BLD AUTO: 54 10^3/UL (ref 150–450)
PLATELET BLD QL SMEAR: (no result)
POLYCHROMASIA BLD QL SMEAR: (no result)
POTASSIUM SERPL-SCNC: 3.7 MEQ/L (ref 3.5–5.1)
PROT SERPL-MCNC: 7.1 GM/DL (ref 6.4–8.2)
PROTHROMBIN TIME: 23.1 SECONDS (ref 12.1–14.4)
RBC # BLD AUTO: 2.33 10^6/UL (ref 4.3–6.1)
SODIUM SERPL-SCNC: 147 MEQ/L (ref 136–145)
WBC # BLD AUTO: 6.2 10^3/UL (ref 4–10)

## 2019-01-26 RX ADMIN — OXAZEPAM SCH MG: 10 CAPSULE, GELATIN COATED ORAL at 09:18

## 2019-01-26 RX ADMIN — LACTULOSE SCH ML: 10 SOLUTION ORAL at 14:00

## 2019-01-26 RX ADMIN — FOLIC ACID SCH MG: 1 TABLET ORAL at 09:17

## 2019-01-26 RX ADMIN — IPRATROPIUM BROMIDE AND ALBUTEROL SULFATE SCH ML: .5; 3 SOLUTION RESPIRATORY (INHALATION) at 19:34

## 2019-01-26 RX ADMIN — MULTIPLE VITAMINS W/ MINERALS TAB SCH TAB: TAB at 09:18

## 2019-01-26 RX ADMIN — POTASSIUM CHLORIDE SCH MEQ: 750 TABLET, EXTENDED RELEASE ORAL at 09:17

## 2019-01-26 RX ADMIN — Medication SCH MLS/HR: at 09:18

## 2019-01-26 RX ADMIN — SODIUM CHLORIDE, PRESERVATIVE FREE SCH ML: 5 INJECTION INTRAVENOUS at 05:57

## 2019-01-26 RX ADMIN — IPRATROPIUM BROMIDE AND ALBUTEROL SULFATE SCH ML: .5; 3 SOLUTION RESPIRATORY (INHALATION) at 15:13

## 2019-01-26 RX ADMIN — OXAZEPAM SCH MG: 10 CAPSULE, GELATIN COATED ORAL at 20:00

## 2019-01-26 RX ADMIN — PHYTONADIONE SCH MG: 5 TABLET ORAL at 10:42

## 2019-01-26 RX ADMIN — IPRATROPIUM BROMIDE AND ALBUTEROL SULFATE SCH ML: .5; 3 SOLUTION RESPIRATORY (INHALATION) at 11:37

## 2019-01-26 RX ADMIN — SODIUM CHLORIDE, PRESERVATIVE FREE SCH ML: 5 INJECTION INTRAVENOUS at 20:04

## 2019-01-26 RX ADMIN — MINERAL OIL AND PETROLATUM SCH DOSE: 150; 830 OINTMENT OPHTHALMIC at 20:04

## 2019-01-26 RX ADMIN — LACTULOSE SCH ML: 10 SOLUTION ORAL at 22:47

## 2019-01-26 RX ADMIN — MINERAL OIL AND PETROLATUM SCH DOSE: 150; 830 OINTMENT OPHTHALMIC at 09:00

## 2019-01-26 RX ADMIN — LORAZEPAM PRN MG: 2 INJECTION INTRAMUSCULAR; INTRAVENOUS at 12:06

## 2019-01-26 RX ADMIN — NICOTINE SCH PATCH: 21 PATCH, EXTENDED RELEASE TRANSDERMAL at 09:17

## 2019-01-26 RX ADMIN — LACTULOSE SCH ML: 10 SOLUTION ORAL at 05:57

## 2019-01-26 RX ADMIN — MINERAL OIL AND PETROLATUM SCH DOSE: 150; 830 OINTMENT OPHTHALMIC at 15:30

## 2019-01-26 RX ADMIN — SODIUM CHLORIDE, PRESERVATIVE FREE SCH ML: 5 INJECTION INTRAVENOUS at 13:32

## 2019-01-26 RX ADMIN — IPRATROPIUM BROMIDE AND ALBUTEROL SULFATE SCH ML: .5; 3 SOLUTION RESPIRATORY (INHALATION) at 06:26

## 2019-01-26 RX ADMIN — FUROSEMIDE SCH MG: 10 INJECTION, SOLUTION INTRAMUSCULAR; INTRAVENOUS at 09:17

## 2019-01-26 NOTE — IPNPDOC
Date Seen


The patient was seen on 1/26/19.





Progress Note


Subjective: Reported that the patient is tolerating his current diet he ate 100%

of his meal the night prior. The patient still oriented to self and is trying to

wiggle out of bed to go get beer. very sleepy during exam and did not 

participate in review of system.





PHYSICAL EXAMINATION:


Vitals: (see below) 


General: No acute distress, laying comfortably in bed.


HEENT: Moist mucous membranes. Bruising over the right orbital region. eyes 

closed, sclera incterus. 


Neck: No JVD or lymphadenopathy


Cardiac: RRR, No murmurs. Significant ecchymosis on the chest wall. no crepitus 

appreciated.


Pulm: Clear to auscultation b/l. No wheezing, rhonchi


Abd: NT/mildly distended but soft. + BS


Ext: 2+ non-pitting edema bilateral lower extremities. No cyanosis.


Neuro: Tremulous 


Skin: Jaundice





LABORATORY DATA: See below.





IMAGING: 


CT chest on 1/20/19


IMPRESSION: 


Ground glass opacities in the right middle lobe. Pneumonia versus contusion.


Opacification of the right lower lobe bronchi. Bronchial mucous plugging in the 


left lower lobe. 


Emphysematous lung disease.


Acute mildly displaced fracture of the sternal body. Early with history.





CT abdomen and pelvis on 1/20/19


IMPRESSION: 


Mildly lobulated contour of the liver. Possible mild cirrhosis.


Fatty infiltration of the liver. 


Cholelithiasis without acute cholecystitis.


Copious stool throughout the colon.


Small supraumbilical hernia containing loops of small bowel. No evidence of 


bowel obstruction.





CT head on 1/20/19


IMPRESSION: 


No acute intracranial hemorrhage.


Mild hypodensities in the periventricular white matter which are consistent 


with chronic small vessel ischemic disease. Unchanged from prior.


Ventricles are in proportion to the degree of atrophy. Mildly increased from 


prior.





ASSESSMENT/PLAN: 


1. Hypoxia/ Pulmonary contusion/pneumonia- setting of a sternal fracture and 

possible aspiration pneumonia


-CXR with increased RLL infiltrate, 


Afebrile with no leukocytosis


-blood culture X2 negative for growth 48 hours. 


-c/w moxifloxacin for 7 days total of antimicrobial therapy to cover for 

aspiration pneumonia


-oxygen as needed. 


-Incentive spirometer





2. AMS- alcholol withdrawl plus hepatic encephalopathy?


-mentation not improving follows some command but still somulent during exam. 


-CT HEAD 01/20/2019: Negative for any acute changes.


-Unlikely wernicke encephalopathy -on thiamine and dextrose


-Is tolerating his pured honey thickened diet. 


-Continue with lactulose 30 ML's every 8 hours and hold if bowel movements grea

ter than 3.


-c/w Serax


-Possible new baseline unsure if the patient needs long-term placement or his 

wife can take care of him





3. Chest wall ecchymosis/sternal fracture- 


-Surgery consulted and recommendated for fracture is pain control and incentive 

spirometer. No surgical intervention at this time. 


-s/p platelets/FFP given ecchymosis - 2 units 


-H/H stable 


-Continue to monitor.





4. Coagulopathy


-secondary to underlying cirrhosis with progressive liver failure


-worsening INR while on Vitamin K. Will reach out to GI, to see if further 

recommendation. This could be since of worsening liver function and prognosis is

guarded. 


s/p FFP/vitamin K.





5. Alcohol abuse


-will need appropriate counseling upon improvement as patient is altered at this

time. 


-Banana bag. 


-CIWA. 





6. Cirrhosis, with bili 7, INR 1.9, meld score 22, 19.6% mortality rate in the 

next 3 months


-Will need appropriate diuresis, as blood pressure tolerates. 


-Steven Rosario consulted. 


-active alcohol use not a candidate for liver transplant referral


-crystal discriminant factor: 75.5 poor prognosis, b/c >32 would benefit from 

methylprednisolone but he is not a candidate for he has underlying infection/p

neumonia and has the Chest wall ecchymosis/injury with low platelet count. Risk 

outweight the benefit


-Continue with lactulose 30 ML's every 8 hours and hold if bowel movements 

greater than 3.





7. Thrombocytopenia 


-likely secondary to cirrhosis. s/p 1 unit of platelets.





8. History of paroxysmal atrial fibrillation 


-per medical records in 2017 


-was previously on Eliquis. 


-b/c of chest wall ecchymosis there is concern for further bleeding


-anticoagulation held for now.





9. H/o COPD -


-c/w nebs 





10. PT - once more stable will consult 





11. DVT prophylaxis-SCDs





12. Overall prognosis guarded. 





13. Speech therapy recommended by mouth pured solids and honey thickened 

liquids. 





13. CODE STATUS: DNR/DNI.





[facility]


Vital Signs/I&O





Vital Signs








  Date Time  Temp Pulse Resp B/P (MAP) Pulse Ox O2 Delivery O2 Flow Rate FiO2


 


1/26/19 05:57 98.3 84 18 122/75 (91) 98 Room Air  


 


1/20/19 12:00       1.0 














I&O- Last 24 Hours up to 6 AM 


 


 1/26/19





 06:00


 


Intake Total 180 ml


 


Output Total 300 ml


 


Balance -120 ml











Laboratory Data


24H LABS


Laboratory Tests 2


1/26/19 05:29: 


Immature Granulocyte % (Auto) 0.5, White Blood Count 6.2, Red Blood Count 2.33L,

Hemoglobin 9.8L, Hematocrit 30.6L, Mean Corpuscular Volume 131.3H, Mean Corpus

cular Hemoglobin 42.1H, Mean Corpuscular Hemoglobin Concent 32.0, Red Cell 

Distribution Width 15.7H, Platelet Count 54L, Neutrophils (%) (Auto) 49.3, 

Lymphocytes (%) (Auto) 24.0, Monocytes (%) (Auto) 23.7H, Eosinophils (%) (Auto) 

1.9, Basophils (%) (Auto) 0.6, Neutrophils # (Auto) 3.1, Lymphocytes # (Auto) 

1.5, Monocytes # (Auto) 1.5H, Eosinophils # (Auto) 0.1, Basophils # (Auto) 0.0, 

Nucleated Red Blood Cells % (auto) 0.0, Platelet Estimate DECREASED, Immature 

Platelet Fraction 3.6, Polychromasia 1+, Anisocytosis 2+, Macrocytosis 3+, 

Prothrombin Time 23.1H, Prothromb Time International Ratio 2.00, Activated 

Partial Thromboplast Time 46.2H, Anion Gap 8, Glomerular Filtration Rate > 60.0,

Blood Urea Nitrogen 15, Creatinine 0.81, Sodium Level 147H, Potassium Level 3.7,

Chloride Level 113H, Carbon Dioxide Level 26, Calcium Level 8.4L, Aspartate 

Amino Transf (AST/SGOT) 41H, Alanine Aminotransferase (ALT/SGPT) 24, Alkaline 

Phosphatase 170H, Total Bilirubin 7.6H, Total Protein 7.1, Albumin 2.2L, 

Magnesium Level 1.9, Ammonia < 10, Albumin/Globulin Ratio 0.45L


CBC/BMP


Laboratory Tests


1/26/19 05:29








Red Blood Count 2.33 L, Mean Corpuscular Volume 131.3 H, Mean Corpuscular 

Hemoglobin 42.1 H, Mean Corpuscular Hemoglobin Concent 32.0, Red Cell 

Distribution Width 15.7 H, Neutrophils (%) (Auto) 49.3, Lymphocytes (%) (Auto) 

24.0, Monocytes (%) (Auto) 23.7 H, Eosinophils (%) (Auto) 1.9, Basophils (%) 

(Auto) 0.6, Neutrophils # (Auto) 3.1, Lymphocytes # (Auto) 1.5, Monocytes # 

(Auto) 1.5 H, Eosinophils # (Auto) 0.1, Basophils # (Auto) 0.0, Calcium Level 

8.4 L, Aspartate Amino Transf (AST/SGOT) 41 H, Alanine Aminotransferase 

(ALT/SGPT) 24, Alkaline Phosphatase 170 H, Total Bilirubin 7.6 H, Total Protein 

7.1, Albumin 2.2 L


Microbiology





Microbiology


1/20/19 Blood Culture - Final, Complete


          NO GROWTH AFTER 5 DAYS


1/20/19 Blood Culture - Final, Complete


          NO GROWTH AFTER 5 DAYS





GME ATTESTATION


GME ATTESTATION


My faculty preceptor for this patient encounter was physically present during 

the encounter and was fully available. All aspects of the patient interview, 

examination, medical decision making process, and medical care plan development 

were reviewed and approved by the faculty preceptor. The faculty preceptor is 

aware and concurs with the plan as stated in the body of this note and will 

attest to such by his/her cosignature.











MARIA DE JESUS CHRISTINA DO       Jan 26, 2019 08:27

## 2019-01-27 VITALS — SYSTOLIC BLOOD PRESSURE: 108 MMHG | DIASTOLIC BLOOD PRESSURE: 60 MMHG

## 2019-01-27 VITALS — DIASTOLIC BLOOD PRESSURE: 63 MMHG | SYSTOLIC BLOOD PRESSURE: 110 MMHG

## 2019-01-27 VITALS — SYSTOLIC BLOOD PRESSURE: 113 MMHG | DIASTOLIC BLOOD PRESSURE: 81 MMHG

## 2019-01-27 VITALS — DIASTOLIC BLOOD PRESSURE: 64 MMHG | SYSTOLIC BLOOD PRESSURE: 104 MMHG

## 2019-01-27 VITALS — SYSTOLIC BLOOD PRESSURE: 101 MMHG | DIASTOLIC BLOOD PRESSURE: 69 MMHG

## 2019-01-27 VITALS — SYSTOLIC BLOOD PRESSURE: 110 MMHG | DIASTOLIC BLOOD PRESSURE: 60 MMHG

## 2019-01-27 VITALS — DIASTOLIC BLOOD PRESSURE: 65 MMHG | SYSTOLIC BLOOD PRESSURE: 109 MMHG

## 2019-01-27 VITALS — SYSTOLIC BLOOD PRESSURE: 111 MMHG | DIASTOLIC BLOOD PRESSURE: 72 MMHG

## 2019-01-27 VITALS — SYSTOLIC BLOOD PRESSURE: 111 MMHG | DIASTOLIC BLOOD PRESSURE: 74 MMHG

## 2019-01-27 VITALS — DIASTOLIC BLOOD PRESSURE: 72 MMHG | SYSTOLIC BLOOD PRESSURE: 93 MMHG

## 2019-01-27 VITALS — SYSTOLIC BLOOD PRESSURE: 114 MMHG | DIASTOLIC BLOOD PRESSURE: 72 MMHG

## 2019-01-27 VITALS — DIASTOLIC BLOOD PRESSURE: 93 MMHG | SYSTOLIC BLOOD PRESSURE: 116 MMHG

## 2019-01-27 VITALS — DIASTOLIC BLOOD PRESSURE: 71 MMHG | SYSTOLIC BLOOD PRESSURE: 115 MMHG

## 2019-01-27 VITALS — DIASTOLIC BLOOD PRESSURE: 62 MMHG | SYSTOLIC BLOOD PRESSURE: 108 MMHG

## 2019-01-27 VITALS — SYSTOLIC BLOOD PRESSURE: 108 MMHG | DIASTOLIC BLOOD PRESSURE: 63 MMHG

## 2019-01-27 VITALS — SYSTOLIC BLOOD PRESSURE: 116 MMHG | DIASTOLIC BLOOD PRESSURE: 61 MMHG

## 2019-01-27 VITALS — SYSTOLIC BLOOD PRESSURE: 125 MMHG | DIASTOLIC BLOOD PRESSURE: 80 MMHG

## 2019-01-27 VITALS — DIASTOLIC BLOOD PRESSURE: 70 MMHG | SYSTOLIC BLOOD PRESSURE: 95 MMHG

## 2019-01-27 VITALS — SYSTOLIC BLOOD PRESSURE: 104 MMHG | DIASTOLIC BLOOD PRESSURE: 60 MMHG

## 2019-01-27 VITALS — DIASTOLIC BLOOD PRESSURE: 67 MMHG | SYSTOLIC BLOOD PRESSURE: 105 MMHG

## 2019-01-27 VITALS — SYSTOLIC BLOOD PRESSURE: 108 MMHG | DIASTOLIC BLOOD PRESSURE: 65 MMHG

## 2019-01-27 VITALS — SYSTOLIC BLOOD PRESSURE: 115 MMHG | DIASTOLIC BLOOD PRESSURE: 65 MMHG

## 2019-01-27 LAB
ALBUMIN SERPL BCG-MCNC: 2.1 GM/DL (ref 3.2–5.2)
ALT SERPL W P-5'-P-CCNC: 23 U/L (ref 12–78)
ANISOCYTOSIS BLD QL SMEAR: (no result)
APPEARANCE UR: (no result)
APTT BLD: 46.5 SECONDS (ref 25.4–37.6)
BACTERIA UR QL AUTO: NEGATIVE
BASOPHILS # BLD AUTO: 0.1 10^3/UL (ref 0–0.2)
BASOPHILS NFR BLD AUTO: 1 % (ref 0–1)
BILIRUB SERPL-MCNC: 7.3 MG/DL (ref 0.2–1)
BILIRUB UR QL STRIP.AUTO: (no result)
BUN SERPL-MCNC: 17 MG/DL (ref 7–18)
CALCIUM SERPL-MCNC: 8.3 MG/DL (ref 8.5–10.1)
CHLORIDE SERPL-SCNC: 114 MEQ/L (ref 98–107)
CO2 SERPL-SCNC: 26 MEQ/L (ref 21–32)
CREAT SERPL-MCNC: 0.84 MG/DL (ref 0.7–1.3)
EOSINOPHIL # BLD AUTO: 0.1 10^3/UL (ref 0–0.5)
EOSINOPHIL NFR BLD AUTO: 1.9 % (ref 0–3)
GFR SERPL CREATININE-BSD FRML MDRD: > 60 ML/MIN/{1.73_M2} (ref 56–?)
GLUCOSE SERPL-MCNC: 107 MG/DL (ref 70–100)
GLUCOSE UR QL STRIP.AUTO: NEGATIVE MG/DL
HCT VFR BLD AUTO: 31.4 % (ref 42–52)
HGB BLD-MCNC: 9.9 G/DL (ref 13.5–17.5)
HGB UR QL STRIP.AUTO: (no result)
HYPOCHROMIA BLD QL SMEAR: (no result)
INR PPP: 2.2
KETONES UR QL STRIP.AUTO: NEGATIVE MG/DL
LEUKOCYTE ESTERASE UR QL STRIP.AUTO: NEGATIVE
LYMPHOCYTES # BLD AUTO: 1.5 10^3/UL (ref 1.5–4.5)
LYMPHOCYTES NFR BLD AUTO: 22.3 % (ref 24–44)
MACROCYTES BLD QL SMEAR: (no result)
MAGNESIUM SERPL-MCNC: 1.9 MG/DL (ref 1.8–2.4)
MCH RBC QN AUTO: 40.2 PG (ref 27–33)
MCHC RBC AUTO-ENTMCNC: 31.5 G/DL (ref 32–36.5)
MCV RBC AUTO: 127.6 FL (ref 80–96)
MONOCYTES # BLD AUTO: 1.7 10^3/UL (ref 0–0.8)
MONOCYTES NFR BLD AUTO: 24.5 % (ref 0–5)
MUCOUS THREADS URNS QL MICRO: (no result)
NEUTROPHILS # BLD AUTO: 3.4 10^3/UL (ref 1.8–7.7)
NEUTROPHILS NFR BLD AUTO: 49.9 % (ref 36–66)
NITRITE UR QL STRIP.AUTO: NEGATIVE
PH UR STRIP.AUTO: 5 UNITS (ref 5–9)
PLATELET # BLD AUTO: 56 10^3/UL (ref 150–450)
PLATELET BLD QL SMEAR: (no result)
POLYCHROMASIA BLD QL SMEAR: (no result)
POTASSIUM SERPL-SCNC: 3.6 MEQ/L (ref 3.5–5.1)
PROT SERPL-MCNC: 7.4 GM/DL (ref 6.4–8.2)
PROT UR QL STRIP.AUTO: NEGATIVE MG/DL
PROTHROMBIN TIME: 24.9 SECONDS (ref 12.1–14.4)
RBC # BLD AUTO: 2.46 10^6/UL (ref 4.3–6.1)
RBC # UR AUTO: 1 /HPF (ref 0–3)
SODIUM SERPL-SCNC: 147 MEQ/L (ref 136–145)
SP GR UR STRIP.AUTO: 1.02 (ref 1–1.03)
SQUAMOUS #/AREA URNS AUTO: 0 /HPF (ref 0–6)
UROBILINOGEN UR QL STRIP.AUTO: 2 MG/DL (ref 0–2)
WBC # BLD AUTO: 6.7 10^3/UL (ref 4–10)
WBC #/AREA URNS AUTO: 1 /HPF (ref 0–3)

## 2019-01-27 RX ADMIN — LACTULOSE SCH ML: 10 SOLUTION ORAL at 14:21

## 2019-01-27 RX ADMIN — IPRATROPIUM BROMIDE AND ALBUTEROL SULFATE SCH ML: .5; 3 SOLUTION RESPIRATORY (INHALATION) at 11:31

## 2019-01-27 RX ADMIN — LACTULOSE SCH ML: 10 SOLUTION ORAL at 23:43

## 2019-01-27 RX ADMIN — OXAZEPAM SCH MG: 10 CAPSULE, GELATIN COATED ORAL at 08:29

## 2019-01-27 RX ADMIN — PHYTONADIONE SCH MG: 5 TABLET ORAL at 08:27

## 2019-01-27 RX ADMIN — IPRATROPIUM BROMIDE AND ALBUTEROL SULFATE SCH ML: .5; 3 SOLUTION RESPIRATORY (INHALATION) at 14:40

## 2019-01-27 RX ADMIN — IPRATROPIUM BROMIDE AND ALBUTEROL SULFATE SCH ML: .5; 3 SOLUTION RESPIRATORY (INHALATION) at 20:36

## 2019-01-27 RX ADMIN — Medication SCH MLS/HR: at 09:37

## 2019-01-27 RX ADMIN — SODIUM CHLORIDE, PRESERVATIVE FREE SCH ML: 5 INJECTION INTRAVENOUS at 06:55

## 2019-01-27 RX ADMIN — POTASSIUM CHLORIDE SCH MEQ: 750 TABLET, EXTENDED RELEASE ORAL at 08:28

## 2019-01-27 RX ADMIN — IPRATROPIUM BROMIDE AND ALBUTEROL SULFATE SCH ML: .5; 3 SOLUTION RESPIRATORY (INHALATION) at 07:42

## 2019-01-27 RX ADMIN — FOLIC ACID SCH MG: 1 TABLET ORAL at 08:28

## 2019-01-27 RX ADMIN — SODIUM CHLORIDE SCH MLS/HR: 4.5 INJECTION, SOLUTION INTRAVENOUS at 16:17

## 2019-01-27 RX ADMIN — SODIUM CHLORIDE, PRESERVATIVE FREE SCH ML: 5 INJECTION INTRAVENOUS at 14:22

## 2019-01-27 RX ADMIN — NICOTINE SCH PATCH: 21 PATCH, EXTENDED RELEASE TRANSDERMAL at 08:28

## 2019-01-27 RX ADMIN — SODIUM CHLORIDE, PRESERVATIVE FREE SCH ML: 5 INJECTION INTRAVENOUS at 23:44

## 2019-01-27 RX ADMIN — MINERAL OIL AND PETROLATUM SCH DOSE: 150; 830 OINTMENT OPHTHALMIC at 16:17

## 2019-01-27 RX ADMIN — OXAZEPAM SCH MG: 10 CAPSULE, GELATIN COATED ORAL at 20:23

## 2019-01-27 RX ADMIN — MINERAL OIL AND PETROLATUM SCH DOSE: 150; 830 OINTMENT OPHTHALMIC at 20:23

## 2019-01-27 RX ADMIN — LACTULOSE SCH ML: 10 SOLUTION ORAL at 05:08

## 2019-01-27 RX ADMIN — MINERAL OIL AND PETROLATUM SCH DOSE: 150; 830 OINTMENT OPHTHALMIC at 08:29

## 2019-01-27 RX ADMIN — MULTIPLE VITAMINS W/ MINERALS TAB SCH TAB: TAB at 08:29

## 2019-01-27 RX ADMIN — SODIUM CHLORIDE SCH MLS/HR: 4.5 INJECTION, SOLUTION INTRAVENOUS at 04:30

## 2019-01-27 NOTE — REP
Clinical:  There were clinical status.

 

Comparison:  01/22/2019.

 

Findings:

Age-related atrophy and microvascular ischemic changes are appreciated.  The

ventricles and sulci are symmetric.  Gray-white differentiation is maintained.

There is no evidence for acute intracranial hemorrhage, mass/mass effect,

pathology or infarction.  No extra-axial fluid collection.  Calvarium is intact.

Paranasal sinuses and mastoid air cells are clear.

 

Impression:

Age related atrophy and microvascular ischemic changes.

No acute intracranial hemorrhage, infarction, or mass/mass effect.

 

 

Electronically Signed by

Isaiah Le MD 01/27/2019 12:45 P

## 2019-01-27 NOTE — ECGEPIP
Stationary ECG Study

                              [facility]

                                       

                                       Test Date:    2019

Pat Name:     KIRILL BULLOCK               Department:   

Patient ID:   [id number]                 Room:         [place] [id number]

Gender:       M                        Technician:   

:          [id number]               Requested By: DEIDRA COLVIN

Order Number: [id number]     Reading MD:   Moses Tucker

                                 Measurements

Intervals                              Axis          

Rate:         136                      P:            

HI:           0                        QRS:          75

QRSD:         97                       T:            -22

QT:           311                                    

QTc:          468                                    

                           Interpretive Statements

Rhythm interpretation is difficult due to artifact

Probably sinus tachycardia

Nonspecific ST-T wave abnormalities

Faster rate than 2019

Electronically Signed On 2019 19:00:36 EST by Moses Tucker

## 2019-01-27 NOTE — IPNPDOC
Text Note


Date of Service


The patient was seen on 1/27/19.





NOTE


Subjective: Pt had AF RVR this am.  Mentation waxing and waning.





PHYSICAL EXAMINATION:


Vitals: (see below) 


General: No acute distress, laying comfortably in bed.


HEENT: Moist mucous membranes. 


Neck: No JVD or lymphadenopathy


Cardiac: RRR, No murmurs. Eecchymosis on the chest wall improved. Tender to 

palpation.


Pulm: Clear to auscultation b/l. No wheezing, rhonchi


Abd: NT/mildly distended. + BS


Ext: Trace edema bilateral lower extremities. No cyanosis.


Neuro: 


Moving all ext. Following commands. 





LABORATORY DATA: See below.





IMAGING: 


CT chest on 1/20/19


IMPRESSION: 


Ground glass opacities in the right middle lobe. Pneumonia versus contusion.


Opacification of the right lower lobe bronchi. Bronchial mucous plugging in the 


left lower lobe. 


Emphysematous lung disease.


Acute mildly displaced fracture of the sternal body. Early with history.





CT abdomen and pelvis on 1/20/19


IMPRESSION: 


Mildly lobulated contour of the liver. Possible mild cirrhosis.


Fatty infiltration of the liver. 


Cholelithiasis without acute cholecystitis.


Copious stool throughout the colon.


Small supraumbilical hernia containing loops of small bowel. No evidence of 


bowel obstruction.





CT head on 1/20/19


IMPRESSION: 


No acute intracranial hemorrhage.


Mild hypodensities in the periventricular white matter which are consistent 


with chronic small vessel ischemic disease. Unchanged from prior.


Ventricles are in proportion to the degree of atrophy. Mildly increased from 


prior.





ASSESSMENT/PLAN: 


1. Hypoxia/ Pulmonary contusion/pneumonia- setting of a sternal fracture. 

Started on moxifloxacin, oxygen as needed. Admit to ICU. Incentive spirometer. 

Oxygen therapy. Dr. Breaux consulted. CXR with increased RLL infiltrate, ? 

Aspiration. D/c vanco. Improving


2. Chest wall ecchymosis/sternal fracture- appreciate Dr. Mantilla's input. 

Observation recommended. s/p platelets/FFP given ecchymosis. Pain control.  

Hemoglobin stable. Continue to monitor.


3. Coagulopathy- secondary to underlying cirrhosis with progressive liver 

failure. s/p FFP/vitamin K.


4. Alcohol abuse- will need appropriate counseling upon improvement as patient 

is altered at this time. Banana bag. CIWA. 


5. Cirrhosis, with bili 7, INR >, meld score> 22, 19.6% mortality rate in the 

next 3 months. Will need appropriate diuresis, as blood pressure tolerates. Steven Rosario consulted. 


6. Thrombocytopenia likely secondary to cirrhosis. s/p 1 unit of platelets.


7. History of paroxysmal atrial fibrillation per medical records in 2017 and was

previously on Eliquis. In the setting of chest wall ecchymosis, concern for 

further bleeding, hold off on blood thinners for now. On cardizem and rate 

controlled. Echo ordered. 


8. H/o COPD - cont nebs 


9. Hepatic encephalopathy s/p 


DVT prophylaxis SCDs





Overall prognosis guarded. 





DNR/DNI. 





I had an extensive conversation with the wife at bedside again today, who is 

aware of the patient's prognosis, and current condition.





We'll consult physical therapy once the patient is more stable





VS,Fishbone, I+O


VS, Fishbone, I+O


Laboratory Tests


1/27/19 04:26








Red Blood Count 2.46 L, Mean Corpuscular Volume 127.6 H, Mean Corpuscular 

Hemoglobin 40.2 H, Mean Corpuscular Hemoglobin Concent 31.5 L, Red Cell 

Distribution Width 15.4 H, Neutrophils (%) (Auto) 49.9, Lymphocytes (%) (Auto) 

22.3 L, Monocytes (%) (Auto) 24.5 H, Eosinophils (%) (Auto) 1.9, Basophils (%) 

(Auto) 1.0, Neutrophils # (Auto) 3.4, Lymphocytes # (Auto) 1.5, Monocytes # 

(Auto) 1.7 H, Eosinophils # (Auto) 0.1, Basophils # (Auto) 0.1, Calcium Level 

8.3 L, Aspartate Amino Transf (AST/SGOT) 37, Alanine Aminotransferase (ALT/SGPT)

23, Alkaline Phosphatase 160 H, Total Bilirubin 7.3 H, Total Protein 7.4, 

Albumin 2.1 L








Vital Signs








  Date Time  Temp Pulse Resp B/P (MAP) Pulse Ox O2 Delivery O2 Flow Rate FiO2


 


1/27/19 08:00  87  116/61    


 


1/27/19 06:30     97 Room Air  


 


1/27/19 03:35 99.2  20     














I&O- Last 24 Hours up to 6 AM 


 


 1/27/19





 06:00


 


Intake Total 1006 ml


 


Output Total 500 ml


 


Balance 506 ml

















SHANNON TORRES MD                 Jan 27, 2019 11:40

## 2019-01-27 NOTE — IPNPDOC
Date Seen


The patient was seen on 1/27/19. 12am





Progress Note


SUBJECTIVE: Called to see patient who was said to be tachycardic with heart rate

in the 170s to 180s. 





Patient is a 53-year-old man with history of alcohol abuse, COPD, paroxysmal A. 

fib, previously on Eliquis, but discontinued on account of alcohol use/fall. He 

is currently being managed for alcohol withdrawal on CIWA protocol, 

hypoxia/pulmonary contusion/pneumonia in the setting of cirrhosis with 

thrombocytopenia, having received a unit of platelets/FFP/vitamin K. 





Patient was started on diltiazem 30 mg every 6 hours. However, heart rate still 

remained above 150s with very soft, blood pressure 104mmHg systolic. EKG done 

reveals A. fib with RVR.





OBJECTIVE


PHYSICAL EXAMINATION:


GENERAL APPEARANCE: Middle aged man, a little confused and asking for beer, He 

appears dehydrated and jaundiced up to his skin, tremulous in bed. He is icteric

but does not appear pale and is afebrile


VITAL SIGNS: Temp 99.2, , /60, RR 19


HEENT: Atraumatic. Neck: Supple.


LUNGS: reduced air entry but Clear to auscultation bilaterally. Bruises observed

over chest


CARDIOVASCULAR: Pulse rate irregularly irregular, S1 and 2 heard, no murmurs, 

rubs or gallops.


ABDOMEN: Bruises observed over abdomen, soft, non tender, non distended.


MUSCULOSKELETAL: Apparently within normal limits. 


SKIN: Jaundiced.


EXTREMITIES: No pedal edema, 2+ bilateral pedal pulses noted.


NEUROLOGICAL: Confused, minimally responds to command, constantly asking for 

beer, but awake and alert. 





LABORATORY DATA, IMAGING STUDIES, MICROBIOLOGY: Please see below.





EKG: A. fib with RVR.





ASSESSMENT 


1. A. fib with RVR.


2. Alcohol withdrawal.





PLAN


1. At this time, I will transfer patient to PCU and beginning rate control with 

IV medications as blood pressure will allow. IV diltiazem 10 mg stat, will 

consider starting Cardizem drip pending status of blood pressure. Patient is not

a good candidate for anticoagulation at this time.


2. If heart rate remains between 110 and 120. We will continue on diltiazem 30 

mg by mouth every 6 hours due to patient's tenuous systolic BP now stands at 90 

mmHg


3. Continue see what protocol for alcohol withdrawal.


4. Patient's physical exam is consistent with dehydration, also evidenced by 

serum sodium 147 done yesterday. Will start IV 0.45% normal saline to run at 100

mils per hour. Day team should reevaluate his volume status/needs, and consider 

cardiology consult in the morning. Will send chemistries now.


5. Further management will be per patient's clinical course.





VS, I&O, 24H, Fishbone


Vital Signs/I&O





Vital Signs








  Date Time  Temp Pulse Resp B/P (MAP) Pulse Ox O2 Delivery O2 Flow Rate FiO2


 


1/27/19 03:43  133  105/67 (80) 96 Room Air  


 


1/27/19 03:35 99.2  20     














I&O- Last 24 Hours up to 6 AM 


 


 1/27/19





 05:59


 


Intake Total 706 ml


 


Output Total 500 ml


 


Balance 206 ml











Laboratory Data


24H LABS


Laboratory Tests 2


1/26/19 05:29: 


Immature Granulocyte % (Auto) 0.5, White Blood Count 6.2, Red Blood Count 2.33L,

Hemoglobin 9.8L, Hematocrit 30.6L, Mean Corpuscular Volume 131.3H, Mean Corpus

cular Hemoglobin 42.1H, Mean Corpuscular Hemoglobin Concent 32.0, Red Cell 

Distribution Width 15.7H, Platelet Count 54L, Neutrophils (%) (Auto) 49.3, 

Lymphocytes (%) (Auto) 24.0, Monocytes (%) (Auto) 23.7H, Eosinophils (%) (Auto) 

1.9, Basophils (%) (Auto) 0.6, Neutrophils # (Auto) 3.1, Lymphocytes # (Auto) 

1.5, Monocytes # (Auto) 1.5H, Eosinophils # (Auto) 0.1, Basophils # (Auto) 0.0, 

Nucleated Red Blood Cells % (auto) 0.0, Platelet Estimate DECREASED, Immature 

Platelet Fraction 3.6, Polychromasia 1+, Anisocytosis 2+, Macrocytosis 3+, 

Prothrombin Time 23.1H, Prothromb Time International Ratio 2.00, Activated 

Partial Thromboplast Time 46.2H, Anion Gap 8, Glomerular Filtration Rate > 60.0,

Blood Urea Nitrogen 15, Creatinine 0.81, Sodium Level 147H, Potassium Level 3.7,

Chloride Level 113H, Carbon Dioxide Level 26, Calcium Level 8.4L, Aspartate 

Amino Transf (AST/SGOT) 41H, Alanine Aminotransferase (ALT/SGPT) 24, Alkaline 

Phosphatase 170H, Total Bilirubin 7.6H, Total Protein 7.1, Albumin 2.2L, 

Magnesium Level 1.9, Ammonia < 10, Albumin/Globulin Ratio 0.45L


CBC/BMP


Laboratory Tests


1/26/19 05:29








Red Blood Count 2.33 L, Mean Corpuscular Volume 131.3 H, Mean Corpuscular 

Hemoglobin 42.1 H, Mean Corpuscular Hemoglobin Concent 32.0, Red Cell 

Distribution Width 15.7 H, Neutrophils (%) (Auto) 49.3, Lymphocytes (%) (Auto) 

24.0, Monocytes (%) (Auto) 23.7 H, Eosinophils (%) (Auto) 1.9, Basophils (%) 

(Auto) 0.6, Neutrophils # (Auto) 3.1, Lymphocytes # (Auto) 1.5, Monocytes # 

(Auto) 1.5 H, Eosinophils # (Auto) 0.1, Basophils # (Auto) 0.0, Calcium Level 

8.4 L, Aspartate Amino Transf (AST/SGOT) 41 H, Alanine Aminotransferase 

(ALT/SGPT) 24, Alkaline Phosphatase 170 H, Total Bilirubin 7.6 H, Total Protein 

7.1, Albumin 2.2 L


Microbiology





Microbiology


1/20/19 Blood Culture - Final, Complete


          NO GROWTH AFTER 5 DAYS


1/20/19 Blood Culture - Final, Complete


          NO GROWTH AFTER 5 DAYS











DEIDRA JOSHUA MD            Jan 27, 2019 04:03

## 2019-01-27 NOTE — REP
Clinical: Acute abdominal pain.

 

Technique:  Gray scale ultrasound using curved array transducer.

 

Comparison:  02/08/2018.

 

Findings:  The liver is increased in echogenicity and enlarged suggesting fatty

infiltration and/or hepatocellular disease.  A patent umbilical vein is

identified along with enlarged main portal vein inserting normal flow pattern and

direction.  The pancreas is incompletely evaluated but visualized portions are

grossly normal.  Cholelithiasis noted without wall thickening or pericholecystic

fluid.  Common bile duct is upper limits of normal at 7 mm.  Right kidney

measures 11.9 x 6.6 x 7.2 cm and 2.9 cm mid pole along with 1.3 cm upper pole

complex areas cannot exclude mass.  No evidence for hydronephrosis.  No ascites

in the visualized right upper quadrant.

 

Impression:

1.  Hepatomegaly with hepatocellular disease and findings including recanalized

umbilical vein and enlarged main portal vein suggesting cirrhosis.

2.  Cholelithiasis

3.  Two nonspecific renal lesions cannot be further characterized by ultrasound

and may warrant pre and postcontrast CT of the abdomen.

 

 

Electronically Signed by

Isaiah Le MD 01/27/2019 06:14 P

## 2019-01-27 NOTE — REP
Clinical:  Follow up right lower lobe infiltrate.

 

Comparison:  01/22/2019, 01/21/2019.

 

Findings:

Improving aeration with decreased opacity at the right lower lobe noted.

Remainder of lung fields are well-aerated.  No effusion.  No pneumothorax.

Mediastinum and cardiac silhouette normal.  Skeletal structures stable.

 

Impression:

Improved aeration with decreased right lower lobe opacity.

 

 

Electronically Signed by

Isaiah Le MD 01/27/2019 08:17 A

## 2019-01-28 VITALS — SYSTOLIC BLOOD PRESSURE: 121 MMHG | DIASTOLIC BLOOD PRESSURE: 68 MMHG

## 2019-01-28 VITALS — DIASTOLIC BLOOD PRESSURE: 59 MMHG | SYSTOLIC BLOOD PRESSURE: 106 MMHG

## 2019-01-28 VITALS — DIASTOLIC BLOOD PRESSURE: 74 MMHG | SYSTOLIC BLOOD PRESSURE: 112 MMHG

## 2019-01-28 VITALS — SYSTOLIC BLOOD PRESSURE: 114 MMHG | DIASTOLIC BLOOD PRESSURE: 68 MMHG

## 2019-01-28 VITALS — SYSTOLIC BLOOD PRESSURE: 116 MMHG | DIASTOLIC BLOOD PRESSURE: 72 MMHG

## 2019-01-28 VITALS — SYSTOLIC BLOOD PRESSURE: 141 MMHG | DIASTOLIC BLOOD PRESSURE: 78 MMHG

## 2019-01-28 VITALS — SYSTOLIC BLOOD PRESSURE: 127 MMHG | DIASTOLIC BLOOD PRESSURE: 72 MMHG

## 2019-01-28 VITALS — SYSTOLIC BLOOD PRESSURE: 131 MMHG | DIASTOLIC BLOOD PRESSURE: 83 MMHG

## 2019-01-28 VITALS — SYSTOLIC BLOOD PRESSURE: 109 MMHG | DIASTOLIC BLOOD PRESSURE: 56 MMHG

## 2019-01-28 VITALS — SYSTOLIC BLOOD PRESSURE: 117 MMHG | DIASTOLIC BLOOD PRESSURE: 72 MMHG

## 2019-01-28 VITALS — SYSTOLIC BLOOD PRESSURE: 107 MMHG | DIASTOLIC BLOOD PRESSURE: 72 MMHG

## 2019-01-28 VITALS — SYSTOLIC BLOOD PRESSURE: 120 MMHG | DIASTOLIC BLOOD PRESSURE: 67 MMHG

## 2019-01-28 LAB
BUN SERPL-MCNC: 13 MG/DL (ref 7–18)
CALCIUM SERPL-MCNC: 8 MG/DL (ref 8.5–10.1)
CHLORIDE SERPL-SCNC: 116 MEQ/L (ref 98–107)
CO2 SERPL-SCNC: 25 MEQ/L (ref 21–32)
CREAT SERPL-MCNC: 0.78 MG/DL (ref 0.7–1.3)
GFR SERPL CREATININE-BSD FRML MDRD: > 60 ML/MIN/{1.73_M2} (ref 56–?)
GLUCOSE SERPL-MCNC: 96 MG/DL (ref 70–100)
HCT VFR BLD AUTO: 30.5 % (ref 42–52)
HGB BLD-MCNC: 9.6 G/DL (ref 13.5–17.5)
MCH RBC QN AUTO: 41.6 PG (ref 27–33)
MCHC RBC AUTO-ENTMCNC: 31.5 G/DL (ref 32–36.5)
MCV RBC AUTO: 132 FL (ref 80–96)
PLATELET # BLD AUTO: 54 10^3/UL (ref 150–450)
POTASSIUM SERPL-SCNC: 4.1 MEQ/L (ref 3.5–5.1)
RBC # BLD AUTO: 2.31 10^6/UL (ref 4.3–6.1)
SODIUM SERPL-SCNC: 146 MEQ/L (ref 136–145)
WBC # BLD AUTO: 7.1 10^3/UL (ref 4–10)

## 2019-01-28 RX ADMIN — PHYTONADIONE SCH MG: 5 TABLET ORAL at 08:44

## 2019-01-28 RX ADMIN — Medication SCH MG: at 08:44

## 2019-01-28 RX ADMIN — SODIUM CHLORIDE, PRESERVATIVE FREE SCH ML: 5 INJECTION INTRAVENOUS at 08:50

## 2019-01-28 RX ADMIN — OXAZEPAM SCH MG: 10 CAPSULE, GELATIN COATED ORAL at 08:44

## 2019-01-28 RX ADMIN — LACTULOSE SCH ML: 10 SOLUTION ORAL at 14:13

## 2019-01-28 RX ADMIN — SODIUM CHLORIDE SCH MLS/HR: 4.5 INJECTION, SOLUTION INTRAVENOUS at 01:10

## 2019-01-28 RX ADMIN — MINERAL OIL AND PETROLATUM SCH DOSE: 150; 830 OINTMENT OPHTHALMIC at 08:51

## 2019-01-28 RX ADMIN — IPRATROPIUM BROMIDE AND ALBUTEROL SULFATE SCH ML: .5; 3 SOLUTION RESPIRATORY (INHALATION) at 19:44

## 2019-01-28 RX ADMIN — OXAZEPAM SCH MG: 10 CAPSULE, GELATIN COATED ORAL at 20:04

## 2019-01-28 RX ADMIN — LACTULOSE SCH ML: 10 SOLUTION ORAL at 08:51

## 2019-01-28 RX ADMIN — IPRATROPIUM BROMIDE AND ALBUTEROL SULFATE SCH ML: .5; 3 SOLUTION RESPIRATORY (INHALATION) at 07:19

## 2019-01-28 RX ADMIN — POTASSIUM CHLORIDE SCH MEQ: 750 TABLET, EXTENDED RELEASE ORAL at 08:44

## 2019-01-28 RX ADMIN — LACTULOSE SCH ML: 10 SOLUTION ORAL at 21:17

## 2019-01-28 RX ADMIN — MULTIPLE VITAMINS W/ MINERALS TAB SCH TAB: TAB at 08:44

## 2019-01-28 RX ADMIN — SODIUM CHLORIDE, PRESERVATIVE FREE SCH ML: 5 INJECTION INTRAVENOUS at 21:17

## 2019-01-28 RX ADMIN — MINERAL OIL AND PETROLATUM SCH DOSE: 150; 830 OINTMENT OPHTHALMIC at 20:04

## 2019-01-28 RX ADMIN — FOLIC ACID SCH MG: 1 TABLET ORAL at 08:45

## 2019-01-28 RX ADMIN — IPRATROPIUM BROMIDE AND ALBUTEROL SULFATE SCH ML: .5; 3 SOLUTION RESPIRATORY (INHALATION) at 12:00

## 2019-01-28 RX ADMIN — SODIUM CHLORIDE SCH MLS/HR: 4.5 INJECTION, SOLUTION INTRAVENOUS at 00:30

## 2019-01-28 RX ADMIN — NICOTINE SCH PATCH: 21 PATCH, EXTENDED RELEASE TRANSDERMAL at 08:51

## 2019-01-28 RX ADMIN — IPRATROPIUM BROMIDE AND ALBUTEROL SULFATE SCH ML: .5; 3 SOLUTION RESPIRATORY (INHALATION) at 14:56

## 2019-01-28 RX ADMIN — MINERAL OIL AND PETROLATUM SCH DOSE: 150; 830 OINTMENT OPHTHALMIC at 15:37

## 2019-01-28 NOTE — REPVR
EXAM: 

 MR Head Without Contrast 



EXAM DATE/TIME: 

 1/28/2019 7:36 PM 



CLINICAL HISTORY: 

 53 years old, male; Signs and symptoms; Altered mental status/memory loss; 

Additional info: AMS 



TECHNIQUE: 

 MR of the head without contrast. 



COMPARISON: 

 CT Head without contrast 1/27/2019 12:27 PM 



FINDINGS: 

 Brain:  There is no evidence of acute stroke. There is moderate diffuse 

atrophy. There is enlargement of the ventricles probably the result of atrophy. 

Communicating type hydrocephalus could give an identical appearance and 

clinical correlation would be important. There is bright signal intensity 

throughout the white matter bilaterally probably chronic ischemic changes. 

Demyelination can give an identical appearance. The diagnosis of MS is reserved 

for those less than 40, unless a chronic history. Other causes of demyelination 

including lupus, collagen vascular disease, leukoencephalopathy which includes 

viral etiology. There is no evidence of mass effect. 

 



IMPRESSION: 

1. Severe diffuse atrophy. 

2. Enlargement of the ventricles probably the result of atrophy. Communicating 

type hydrocephalus could give an identical appearance and clinical correlation 

would be important. 

3. Bright signal intensity throughout the white matter probably chronic 

ischemic change. Demyelination could also cause this appearance. Please see 

above discussion. 



Electronically signed by: Gregg Mak On 01/28/2019  21:41:27 PM

## 2019-01-28 NOTE — ECHO
DATE OF PROCEDURE: 01/28/2019

 

REFERRING PHYSICIAN: Dr. Erich Shanonn.

 

INDICATION: Abnormal ECG.

 

Height 178 cm

Weight 90 kg.

 

DIMENSIONS:

IVS 1.2

LV 3.8

LVPW 1.2

LA 4.2

Aorta 3.0

IVC: 2.2

 

FINDINGS: Study is of a difficult technical quality.  Apparently the patient was

uncooperative with the exam.  The patient is in atrial fibrillation with rapid

ventricular response varying between 110 and 150 beats per minute.

 

Left ventricle is normal size.  There is probably normal LV systolic function,

but it is somewhat challenging exam due to underlying tachycardia and poor

cooperation but I do not appreciate any distinct segmental wall motion

abnormalities.  Right ventricle was poorly seen but grossly appears normal.  
Both

atria are at least mildly enlarged.  Aortic valve is minimally sclerotic

but leaflet mobility is preserved.  There are also mild degenerative

abnormalities of mitral valve with mitral annular calcifications.  Leaflet

mobility is preserved as well.  Tricuspid valve appears normal.  Pulmonic valve

was not visualized.  No pericardial effusion is noted.  Inferior vena cava is

dilated and there is no collapse with respiration indicative of likely very high

central venous pressure.  Aortic root is normal.  Aortic arch and abdominal 
aorta

were very poorly seen.

 

Doppler interrogation of aortic valve reveals no significant stenosis or

insufficiency.  There is trace mitral insufficiency.  Mild tricuspid

insufficiency is present.  Unfortunately quality of TR jet was not sufficient to

adequately estimate pulmonary artery pressure.

 

Evaluation of diastolic function is inconclusive due to underlying atrial

fibrillation but tissue Doppler velocities of mitral annulus are high indicative

not more than mild degree of diastolic dysfunction.

 

CONCLUSIONS:

1.  Study is of limited technical quality.

2.  Normal LV size with mild LVH and probably normal systolic function.

3.  Aortic sclerosis but no stenosis.

4.  Trace mitral insufficiency.

5.  Mild tricuspid insufficiency.

6.  High central venous pressure but unable to reliably estimate pulmonary 
artery

pressure under

 

COMMENT: SBE prophylaxis is not recommended.

MTDD

## 2019-01-28 NOTE — EEG
DATE OF PROCEDURE: 01/28/2019

 

REFERRING PHYSICIAN: Dr. Erich Mendez.

 

DIAGNOSIS: Altered mental status.

 

EEG NUMBER: [id number]

 

HISTORY: The patient is a 53-year-old man with history of alcoholic cirrhosis who

was admitted at [facility] due to altered mental status.  This EEG

was done to rule out epileptic potential and assess degree of encephalopathy.

The patient is currently on folic acid, vitamin K, oxazepam, diltiazem, thiamine,

etc..

 

TECHNICAL DESCRIPTION: This digital EEG was recorded by 21 scalp ear and two EKG

electrodes and was reviewed in bipolar and referential montages following

reformatting in 10-20 international echo placement system.

 

INTERPRETATION: The patient was noted to be in awake and drowsy states during

this EEG.  Resting awake background rhythm consisted of 13 Hz beta activity

measuring 10-30 microvolts in amplitude which was symmetric bilaterally.

Anteriorly low voltage and mixed frequency activity was noted.  Stage I and II

sleep were reviewed and was symmetric bilaterally.  Hyperventilation could not be

performed.  Photic stimulation remained unremarkable. EKG revealed irregular

heartbeat.  No focal, lateralizing or epileptiform abnormalities were seen.  No

relevant clinical activity was noted.

 

CONCLUSION: This EEG and awake, drowsy states, stage I and II sleep is within

normal limits.  Excessive beta activity is likely due to medication effect.  A

12-lead EKG is recommended for further evaluation of the cardiac rhythm.

## 2019-01-28 NOTE — IPNPDOC
Date Seen


The patient was seen on 1/28/19.





Progress Note


Subjective: Patient was moved to the ICU because he was in A. fib with RVR. This

morning his heart rate was 150-160 with a systolic blood pressure 131. He did 

not appear any distress. He continues to be noncommunicative but follows some 

commands such as opening his mouth to be fed. He does not squeeze fingers raise 

his legs or answers questions entirely appropriately. No other events reported 

overnight.





PHYSICAL EXAMINATION:


Vitals: (see below) 


General: No acute distress, laying comfortably in bed.


HEENT: Moist mucous membranes. Bruising over the right orbital region. eyes 

closed, sclera incterus. 


Neck: No JVD or lymphadenopathy


Cardiac: Irregularly irregular tachycardic currently not rate controlled. 

Possible right second intercostal murmur?. Significant ecchymosis on the chest 

wall. no crepitus appreciated.


Pulm: Clear to auscultation b/l. No wheezing, rhonchi


Abd: NT/mildly distended but soft. + BS


Ext: 2+ non-pitting edema bilateral lower extremities. No cyanosis.


Neuro: Tremulous 


Skin: Jaundice





LABORATORY DATA: See below.





IMAGING: 


CT chest on 1/20/19


IMPRESSION: 


Ground glass opacities in the right middle lobe. Pneumonia versus contusion.


Opacification of the right lower lobe bronchi. Bronchial mucous plugging in the 


left lower lobe. 


Emphysematous lung disease.


Acute mildly displaced fracture of the sternal body. Early with history.





CT abdomen and pelvis on 1/20/19


IMPRESSION: 


Mildly lobulated contour of the liver. Possible mild cirrhosis.


Fatty infiltration of the liver. 


Cholelithiasis without acute cholecystitis.


Copious stool throughout the colon.


Small supraumbilical hernia containing loops of small bowel. No evidence of 


bowel obstruction.





CT head on 1/20/19


IMPRESSION: 


No acute intracranial hemorrhage.


Mild hypodensities in the periventricular white matter which are consistent 


with chronic small vessel ischemic disease. Unchanged from prior.


Ventricles are in proportion to the degree of atrophy. Mildly increased from 


prior.





ASSESSMENT/PLAN: 





History of paroxysmal atrial fibrillation 


-per medical records in 2017 


-was previously on Eliquis. 


-b/c of chest wall ecchymosis there is concern for further bleeding


-anticoagulation held for now.


-c/w Cardizem PO -will give a one-time dose of Cardizem 30 mg IV.


-Echo pending








AMS- alcholol withdrawl plus hepatic encephalopathy?


-mentation not improving follows some command but still somulent during exam. 


-CT HEAD 01/27/2019: Negative for any acute changes.


-Unlikely wernicke encephalopathy -on thiamine and dextrose


-Is tolerating his pured honey thickened diet. 


-Continue with lactulose 30 ML's every 8 hours and hold if bowel movements 

greater than 3.


-c/w Serax


-Possible new baseline unsure if the patient needs long-term placement or his 

wife can take care of him


-Neurology consulted and EEG pending








Cirrhosis, with bili 7, INR 1.9, meld score 22, 19.6% mortality rate in the next

3 months


-Will need appropriate diuresis, as blood pressure tolerates. 


-Steven Rosario consulted. 


-active alcohol use not a candidate for liver transplant referral


-maría discriminant factor: 75.5 poor prognosis, b/c >32 would benefit from 

methylprednisolone but he is not a candidate for he has underlying 

infection/pneumonia and has the Chest wall ecchymosis/injury with low platelet 

count. Risk outweight the benefit


-Continue with lactulose 30 ML's every 8 hours and hold if bowel movements 

greater than 3.


-Abdominal ultrasound 01/27/2019:consistent with cirrhosis








Chest wall ecchymosis/sternal fracture- 


-Surgery consulted and recommendated for fracture is pain control and incentive 

spirometer. No surgical intervention at this time. 


-s/p platelets/FFP given ecchymosis - 2 units 


-H/H stable 


-Continue to monitor.





Coagulopathy


-secondary to underlying cirrhosis with progressive liver failure


-worsening INR while on Vitamin K. Will reach out to GI, to see if further 

recommendation. This could be since of worsening liver function and prognosis is

guarded. 


s/p FFP/vitamin K.





Alcohol abuse


-will need appropriate counseling upon improvement as patient is altered at this

time. 


-CIWA. 





Thrombocytopenia (Stable)


-likely secondary to cirrhosis. s/p 1 unit of platelets.





Hypoxia/ Pulmonary contusion/aspiration pneumonia(resolved)


-Completed seven-day course of moxifloxacin


-oxygen as needed. 





H/o COPD -


-c/w nebs 





PT - once more stable will consult 





DVT prophylaxis-SCDs





Overall prognosis guarded. 





Speech therapy recommended by mouth pured solids and honey thickened liquids. 





CODE STATUS: DNR/DNI.





VS, I&O, 24H, Fishbone


Vital Signs/I&O





Vital Signs








  Date Time  Temp Pulse Resp B/P (MAP) Pulse Ox O2 Delivery O2 Flow Rate FiO2


 


1/28/19 04:00 97.8 89 16 120/67 (84) 98 Room Air  














I&O- Last 24 Hours up to 6 AM 


 


 1/28/19





 06:00


 


Intake Total 1440 ml


 


Output Total 350 ml


 


Balance 1090 ml











Laboratory Data


24H LABS


Laboratory Tests 2


1/27/19 10:11: 


Urine Appearance HAZY, Urine Color WALE, Urine pH 5.0, Urine Specific Gravity 

1.023, Urine Protein NEGATIVE, Urine Glucose (UA) NEGATIVE, Urine Ketones 

NEGATIVE, Urine Urobilinogen 2.0H, Urine Bilirubin 1+H, Urine Leukocyte Esterase

NEGATIVE, Urine Blood 1+H, Urine Nitrite NEGATIVE, Urine WBC (Auto) 1, Urine RBC

(Auto) 1, Urine Hyaline Casts (Auto) 0, Urine Bacteria (Auto) NEGATIVE, Urine 

Squamous Epithelial Cells 0, Urine Mucus (Auto) SMALL, Urine Sperm (Auto) 


1/28/19 04:55: 


Nucleated Red Blood Cells % (auto) 0.4H, Immature Platelet Fraction 3.8, Anion G

ap 5L, Glomerular Filtration Rate > 60.0, Blood Urea Nitrogen 13, Creatinine 

0.78, Sodium Level 146H, Potassium Level 4.1, Chloride Level 116H, Carbon 

Dioxide Level 25, Calcium Level 8.0L


CBC/BMP


Laboratory Tests


1/28/19 04:55








Red Blood Count 2.31 L, Mean Corpuscular Volume 132.0 H, Mean Corpuscular 

Hemoglobin 41.6 H, Mean Corpuscular Hemoglobin Concent 31.5 L, Red Cell Dist

ribution Width 15.0 H, Calcium Level 8.0 L


Microbiology





Microbiology


1/27/19 Blood Culture, Received


          Pending


1/27/19 Blood Culture, Received


          Pending


1/20/19 Blood Culture - Final, Complete


          NO GROWTH AFTER 5 DAYS


1/20/19 Blood Culture - Final, Complete


          NO GROWTH AFTER 5 DAYS


1/27/19 Urine Culture, Received


          Pending





GME ATTESTATION


GME ATTESTATION


My faculty preceptor for this patient encounter was physically present during 

the encounter and was fully available. All aspects of the patient interview, 

examination, medical decision making process, and medical care plan development 

were reviewed and approved by the faculty preceptor. The faculty preceptor is 

aware and concurs with the plan as stated in the body of this note and will 

attest to such by his/her cosignature.











MARIA DE JESUS CHRISTINA DO       Jan 28, 2019 07:20

## 2019-01-29 VITALS — SYSTOLIC BLOOD PRESSURE: 101 MMHG | DIASTOLIC BLOOD PRESSURE: 74 MMHG

## 2019-01-29 VITALS — DIASTOLIC BLOOD PRESSURE: 72 MMHG | SYSTOLIC BLOOD PRESSURE: 110 MMHG

## 2019-01-29 VITALS — SYSTOLIC BLOOD PRESSURE: 118 MMHG | DIASTOLIC BLOOD PRESSURE: 61 MMHG

## 2019-01-29 VITALS — DIASTOLIC BLOOD PRESSURE: 76 MMHG | SYSTOLIC BLOOD PRESSURE: 109 MMHG

## 2019-01-29 VITALS — DIASTOLIC BLOOD PRESSURE: 76 MMHG | SYSTOLIC BLOOD PRESSURE: 101 MMHG

## 2019-01-29 VITALS — DIASTOLIC BLOOD PRESSURE: 73 MMHG | SYSTOLIC BLOOD PRESSURE: 108 MMHG

## 2019-01-29 VITALS — SYSTOLIC BLOOD PRESSURE: 114 MMHG | DIASTOLIC BLOOD PRESSURE: 75 MMHG

## 2019-01-29 VITALS — DIASTOLIC BLOOD PRESSURE: 57 MMHG | SYSTOLIC BLOOD PRESSURE: 96 MMHG

## 2019-01-29 VITALS — DIASTOLIC BLOOD PRESSURE: 82 MMHG | SYSTOLIC BLOOD PRESSURE: 122 MMHG

## 2019-01-29 VITALS — SYSTOLIC BLOOD PRESSURE: 139 MMHG | DIASTOLIC BLOOD PRESSURE: 71 MMHG

## 2019-01-29 VITALS — DIASTOLIC BLOOD PRESSURE: 64 MMHG | SYSTOLIC BLOOD PRESSURE: 92 MMHG

## 2019-01-29 VITALS — DIASTOLIC BLOOD PRESSURE: 68 MMHG | SYSTOLIC BLOOD PRESSURE: 115 MMHG

## 2019-01-29 VITALS — SYSTOLIC BLOOD PRESSURE: 106 MMHG | DIASTOLIC BLOOD PRESSURE: 77 MMHG

## 2019-01-29 LAB
BUN SERPL-MCNC: 14 MG/DL (ref 7–18)
BUN SERPL-MCNC: 16 MG/DL (ref 7–18)
CALCIUM SERPL-MCNC: 8.3 MG/DL (ref 8.5–10.1)
CALCIUM SERPL-MCNC: 8.5 MG/DL (ref 8.5–10.1)
CHLORIDE SERPL-SCNC: 117 MEQ/L (ref 98–107)
CHLORIDE SERPL-SCNC: 120 MEQ/L (ref 98–107)
CO2 SERPL-SCNC: 23 MEQ/L (ref 21–32)
CO2 SERPL-SCNC: 25 MEQ/L (ref 21–32)
CREAT SERPL-MCNC: 0.82 MG/DL (ref 0.7–1.3)
CREAT SERPL-MCNC: 0.96 MG/DL (ref 0.7–1.3)
GFR SERPL CREATININE-BSD FRML MDRD: > 60 ML/MIN/{1.73_M2} (ref 56–?)
GFR SERPL CREATININE-BSD FRML MDRD: > 60 ML/MIN/{1.73_M2} (ref 56–?)
GLUCOSE SERPL-MCNC: 117 MG/DL (ref 70–100)
GLUCOSE SERPL-MCNC: 91 MG/DL (ref 70–100)
HCT VFR BLD AUTO: 32.7 % (ref 42–52)
HGB BLD-MCNC: 10.3 G/DL (ref 13.5–17.5)
MCH RBC QN AUTO: 41 PG (ref 27–33)
MCHC RBC AUTO-ENTMCNC: 31.5 G/DL (ref 32–36.5)
MCV RBC AUTO: 130.3 FL (ref 80–96)
PLATELET # BLD AUTO: 61 10^3/UL (ref 150–450)
POTASSIUM SERPL-SCNC: 4 MEQ/L (ref 3.5–5.1)
POTASSIUM SERPL-SCNC: 4.4 MEQ/L (ref 3.5–5.1)
RBC # BLD AUTO: 2.51 10^6/UL (ref 4.3–6.1)
SODIUM SERPL-SCNC: 148 MEQ/L (ref 136–145)
SODIUM SERPL-SCNC: 150 MEQ/L (ref 136–145)
WBC # BLD AUTO: 9 10^3/UL (ref 4–10)

## 2019-01-29 RX ADMIN — SODIUM CHLORIDE, PRESERVATIVE FREE SCH ML: 5 INJECTION INTRAVENOUS at 21:26

## 2019-01-29 RX ADMIN — IPRATROPIUM BROMIDE AND ALBUTEROL SULFATE SCH ML: .5; 3 SOLUTION RESPIRATORY (INHALATION) at 12:00

## 2019-01-29 RX ADMIN — MINERAL OIL AND PETROLATUM SCH DOSE: 150; 830 OINTMENT OPHTHALMIC at 20:09

## 2019-01-29 RX ADMIN — POTASSIUM CHLORIDE SCH MEQ: 750 TABLET, EXTENDED RELEASE ORAL at 12:10

## 2019-01-29 RX ADMIN — LACTULOSE SCH ML: 10 SOLUTION ORAL at 21:26

## 2019-01-29 RX ADMIN — MULTIPLE VITAMINS W/ MINERALS TAB SCH TAB: TAB at 08:11

## 2019-01-29 RX ADMIN — MINERAL OIL AND PETROLATUM SCH DOSE: 150; 830 OINTMENT OPHTHALMIC at 08:12

## 2019-01-29 RX ADMIN — SODIUM CHLORIDE, PRESERVATIVE FREE SCH ML: 5 INJECTION INTRAVENOUS at 14:04

## 2019-01-29 RX ADMIN — IPRATROPIUM BROMIDE AND ALBUTEROL SULFATE SCH ML: .5; 3 SOLUTION RESPIRATORY (INHALATION) at 16:00

## 2019-01-29 RX ADMIN — NICOTINE SCH PATCH: 21 PATCH, EXTENDED RELEASE TRANSDERMAL at 08:12

## 2019-01-29 RX ADMIN — LACTULOSE SCH ML: 10 SOLUTION ORAL at 14:06

## 2019-01-29 RX ADMIN — FOLIC ACID SCH MG: 1 TABLET ORAL at 08:10

## 2019-01-29 RX ADMIN — IPRATROPIUM BROMIDE AND ALBUTEROL SULFATE SCH ML: .5; 3 SOLUTION RESPIRATORY (INHALATION) at 07:36

## 2019-01-29 RX ADMIN — Medication SCH MG: at 08:11

## 2019-01-29 RX ADMIN — LACTULOSE SCH ML: 10 SOLUTION ORAL at 06:00

## 2019-01-29 RX ADMIN — MINERAL OIL AND PETROLATUM SCH DOSE: 150; 830 OINTMENT OPHTHALMIC at 16:17

## 2019-01-29 RX ADMIN — PHYTONADIONE SCH MG: 5 TABLET ORAL at 08:10

## 2019-01-29 RX ADMIN — OXAZEPAM SCH MG: 10 CAPSULE, GELATIN COATED ORAL at 08:10

## 2019-01-29 RX ADMIN — IPRATROPIUM BROMIDE AND ALBUTEROL SULFATE SCH ML: .5; 3 SOLUTION RESPIRATORY (INHALATION) at 20:14

## 2019-01-29 RX ADMIN — SODIUM CHLORIDE, PRESERVATIVE FREE SCH ML: 5 INJECTION INTRAVENOUS at 06:00

## 2019-01-29 RX ADMIN — PREDNISOLONE SODIUM PHOSPHATE SCH MG: 15 SOLUTION ORAL at 17:58

## 2019-01-29 NOTE — CR
DATE OF CONSULTATION: 01/29/2019

 

 

REASON FOR CONSULTATION:  Altered mental status, rule out subclinical seizures.

 

The patient is a 53-year-old male currently in the intensive care unit (ICU) at

[facility].  The patient presented with having a fall, chest pain,

shortness of breath.  The patient was noted to have extensive contusion of his

chest with infiltrates his lungs.  The patient was placed on antibiotics and

treated.  The patient is a healthy alcoholic and drinks 12 beers within a day.

He has cirrhosis.  The patient had a head CT which was negative for any acute

intracranial process.  The head CT result was read as age-appropriate atrophy.

The patient is a 53-year-old male and the atrophy that is noted is quite

extensive and beyond what would be seen in someone who is 53 years of age.  The

patient did have ammonia levels from 56 reduced down to 10 with lactulose.

Urinalysis is negative.  CBC, CMP have been completed.  The patient has slightly

elevated sodium level of 146.  His hemoglobin is 9.67 and hematocrit 30.4,

platelets 54.  This was thought to be chronic in nature due to his liver

dysfunction.  The patient does have an elevated INR in a therapeutic range.  He

is noted to go into  atrial fibrillation with rapid ventricular response (RVR).

He had a temperature of 101.2 degrees Fahrenheit on 01/21/2019.  Temperature

today is 98.2.  The patient is able to speak.  He is oriented to his name.  He is

able to answer questions appropriately. He is able to follow commands.  He states

he drinks between 6-12 beers in a day.  The patient is noted to have some

tremulousness of his upper extremities.  The patient was keeping his eyes closed

for the most part.  He appears to be lethargic.  The patient is being treated

with Serax for alcohol withdrawal.  According to the patient's wife, the patient

had a prolonged hospital stay at [facility] for similar alcohol withdrawal past

it seems.

 

REVIEW OF SYSTEMS:  The patient denies any pain or any shortness of breath

presently.  He denies any headache.  All other systems reviewed and are negative

except as per history of present illness (HPI).

 

ALLERGIES:  PENICILLIN, CONTRAST MEDIA.

 

MEDICATIONS:  Serax

 

HOME MEDICATIONS:  Furosemide 20 mg by mouth twice a day

 

SOCIAL HISTORY:  The patient drinks 12 beers a day.  He is smoking one pack of

tobacco a day for the past 30 years. Live at home with his wife.

 

FAMILY HISTORY:  Noncontributory.  Mother with history of seizures.

 

PHYSICAL EXAMINATION:

Blood pressure is 109/56, pulse rate 133, temperature is 98.2 degrees Fahrenheit,

respiratory rate is 20, oxygenation 98% on room air.

The patient is oriented to his name.  He follows commands.  He has a low volume

voice at the present time though his speech is audible and without aphasia or

dysarthria.  Pupils are 1.5 mm.  Extraocular movements appear to be intact in all

directions.  The patient has significant scleral icterus.  Sensation V1, V2-V3 is

intact to light touch.  Tongue is midline.  The patient has reasonable strength

in the arms and legs.  The patient has difficult time cooperating in individual

muscle testing.  Deep tendon reflexes are 2+ throughout.  Babinski signs are

absent.  Sensory is intact to light touch in all four extremities.  The patient

has a restraining mittens on. Coordination finger-to-nose could not be ideally

tested at the present time.  The patient is tremulous.  Gait deferred.

 

ASSESSMENT:

1.  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

of alcohol, tobacco, with fall, contusion of chest, shortness of breath and

pneumonia and prolonged confusional state, likely due to the patient's alcohol

withdrawal.  EEG did not show any seizure activity or any nonconvulsive status at

this time.  Given the advanced degree of atrophy seen on the brain scan the

patient most likely will need more time to fully recover from his alcohol

withdrawal.  MRI of the brain has been recommended and is pending at this time.

Rule out any acute strokes including bilateral thalamic strokes.

2.  Continue supportive care.

## 2019-01-29 NOTE — IPNPDOC
Date Seen


The patient was seen on 1/29/19.





Progress Note


Subjective: Patient seen this morning. Mentation has not been improved. 

Neurology saw the patient yesterday EEG was performed which was negative. The 

patient is possibly hepatic encephalopathy and because of his extensive history 

with possibly take a little while to come out of state. The wife was at bedside 

this morning and had a discussion with her who states that if this is the 

patient's new baseline she would need some help at home to take care of him for 

she works 40 hours today but she would like to take him home for possible 

placement is a candidate based on his insurance and she is open to that as well.

Overnight he continues to be in A. fib but his rate was more controlled until 

the morning. He reverted to sinus rhythm around 10:30 AM adjustments have been 

made for his cardiac medications we will continue to monitor. 





PHYSICAL EXAMINATION:


Vitals: (see below) 


General: No acute distress, laying comfortably in bed.


HEENT: Moist mucous membranes. Bruising over the right orbital region. eyes 

closed, sclera incterus. 


Neck: No JVD or lymphadenopathy


Cardiac: Sinus rhythm rate controlled in the 80s. Possible right second 

intercostal murmur?. Significant ecchymosis on the chest wall. no crepitus 

appreciated.


Pulm: Clear to auscultation b/l. No wheezing, rhonchi


Abd: NT/mildly distended but soft. + BS


Ext: Non-pitting edema bilateral lower extremities. No cyanosis.


Neuro: No tremor was noted on exam


Skin: Jaundice





LABORATORY DATA: See below.





IMAGING: 


CT chest on 1/20/19


IMPRESSION: 


Ground glass opacities in the right middle lobe. Pneumonia versus contusion.


Opacification of the right lower lobe bronchi. Bronchial mucous plugging in the 


left lower lobe. 


Emphysematous lung disease.


Acute mildly displaced fracture of the sternal body. Early with history.





CT abdomen and pelvis on 1/20/19


IMPRESSION: 


Mildly lobulated contour of the liver. Possible mild cirrhosis.


Fatty infiltration of the liver. 


Cholelithiasis without acute cholecystitis.


Copious stool throughout the colon.


Small supraumbilical hernia containing loops of small bowel. No evidence of 


bowel obstruction.





CT head on 1/20/19


IMPRESSION: 


No acute intracranial hemorrhage.


Mild hypodensities in the periventricular white matter which are consistent 


with chronic small vessel ischemic disease. Unchanged from prior.


Ventricles are in proportion to the degree of atrophy. Mildly increased from 


prior.





ASSESSMENT/PLAN: 





History of paroxysmal atrial fibrillation 


-per medical records in 2017 


-was previously on Eliquis. 


-b/c of chest wall ecchymosis there is concern for further bleeding


-anticoagulation held for now.


-c/w Cardizem PO -will give a one-time dose of Cardizem 30 mg IV.


-Echo pending








AMS- alcholol withdrawl plus hepatic encephalopathy?


-mentation not improving follows some command but still somulent during exam. 


-CT HEAD 01/27/2019: Negative for any acute changes.


-Unlikely wernicke encephalopathy -on thiamine and dextrose


-Is tolerating his pured honey thickened diet. 


-Continue with lactulose 30 ML's every 8 hours and hold if bowel movements 

greater than 3.


-c/w Serax


-Possible new baseline unsure if the patient needs long-term placement or his 

wife can take care of him


-Neurology consulted and EEG pending








Cirrhosis, with bili 7, INR 1.9, meld score 22, 19.6% mortality rate in the next

3 months


-Will need appropriate diuresis, as blood pressure tolerates. 


-Steven Rosario consulted. 


-active alcohol use not a candidate for liver transplant referral


-[facility] discriminant factor: 75.5 poor prognosis, b/c >32 would benefit from 

prednisolone 40qd [id number] then taper for 2 weeks. 


-Continue with lactulose 30 ML's every 8 hours and hold if bowel movements 

greater than 3.


-Abdominal ultrasound 01/27/2019:consistent with cirrhosis








Chest wall ecchymosis/sternal fracture- 


-Surgery consulted and recommendated for fracture is pain control and incentive 

spirometer. No surgical intervention at this time. 


-s/p platelets/FFP given ecchymosis - 2 units 


-H/H stable 


-Continue to monitor.





Coagulopathy


-secondary to underlying cirrhosis with progressive liver failure


-worsening INR while on Vitamin K. (Possibly discontinue the vitamin K in the 

AM, after rechecking INR, if no change. possible discontinuing.)


-This could be since of worsening liver function and prognosis is guarded. 


-s/p FFP/vitamin K.





Alcohol abuse


-will need appropriate counseling upon improvement as patient is altered at this

time. 


-discontinue Ativan


Serax 10 mg every 6 when necessary for agitation





Thrombocytopenia (Stable)


-likely secondary to cirrhosis. s/p 1 unit of platelets.





Hypoxia/ Pulmonary contusion/aspiration pneumonia(resolved)


-Completed seven-day course of moxifloxacin


-oxygen as needed. 





H/o COPD -


-c/w nebs 





PT - once more stable will consult 





DVT prophylaxis-SCDs





Overall prognosis guarded. 





Speech therapy recommended by mouth pured solids and honey thickened liquids. 





CODE STATUS: DNR/DNI.





Disposition: continue sustain sinus rhythm till tomorrow consider possible 

MedSurg with telemetry





VS, I&O, 24H, Fishbone


Vital Signs/I&O





Vital Signs








  Date Time  Temp Pulse Resp B/P (MAP) Pulse Ox O2 Delivery O2 Flow Rate FiO2


 


1/29/19 14:00  83  110/72    


 


1/29/19 12:01 98.9  22  100 Room Air  














I&O- Last 24 Hours up to 6 AM 


 


 1/29/19





 06:00


 


Intake Total 655 ml


 


Output Total 525 ml


 


Balance 130 ml











Laboratory Data


24H LABS


Laboratory Tests 2


1/29/19 04:40: 


Nucleated Red Blood Cells % (auto) 0.4H, Anion Gap 7L, Glomerular Filtration 

Rate > 60.0, Blood Urea Nitrogen 14, Creatinine 0.82, Sodium Level 150H, 

Potassium Level 4.4, Chloride Level 120H, Carbon Dioxide Level 23, Calcium Level

8.3L


1/29/19 14:04: 


Anion Gap 6L, Glomerular Filtration Rate > 60.0, Blood Urea Nitrogen 16, 

Creatinine 0.96, Sodium Level 148H, Potassium Level 4.0, Chloride Level 117H, 

Carbon Dioxide Level 25, Calcium Level 8.5


CBC/BMP


Laboratory Tests


1/29/19 04:40








Red Blood Count 2.51 L, Mean Corpuscular Volume 130.3 H, Mean Corpuscular Hemogl

obin 41.0 H, Mean Corpuscular Hemoglobin Concent 31.5 L, Red Cell Distribution 

Width 15.0 H, Calcium Level 8.3 L





1/29/19 14:04








Calcium Level 8.5


Microbiology





Microbiology


1/27/19 Blood Culture - Preliminary, Resulted


          No Growth after 48 hours. All Specime...


1/27/19 Blood Culture - Preliminary, Resulted


          No Growth after 48 hours. All Specime...


1/20/19 Blood Culture - Final, Complete


          NO GROWTH AFTER 5 DAYS


1/20/19 Blood Culture - Final, Complete


          NO GROWTH AFTER 5 DAYS


1/27/19 Urine Culture - Final, Complete





GME ATTESTATION


GME ATTESTATION


My faculty preceptor for this patient encounter was physically present during 

the encounter and was fully available. All aspects of the patient interview, 

examination, medical decision making process, and medical care plan development 

were reviewed and approved by the faculty preceptor. The faculty preceptor is a

ware and concurs with the plan as stated in the body of this note and will 

attest to such by his/her cosignature.





ATTENDING NOTE


I have both independently examined this patient as well as reviewed the note  I 

have discussed in detail the findings and plan of treatment as documented in the

note. I will continue to follow the patient and offer further guidance to the 

patients care as necessary during this hospital stay.





MARIA DE JESUS Reese MD, DO       Jan 29, 2019 16:05


HANANE CIFUENTES MD                      Jan 30, 2019 12:51

## 2019-01-30 VITALS — DIASTOLIC BLOOD PRESSURE: 69 MMHG | SYSTOLIC BLOOD PRESSURE: 104 MMHG

## 2019-01-30 VITALS — SYSTOLIC BLOOD PRESSURE: 117 MMHG | DIASTOLIC BLOOD PRESSURE: 75 MMHG

## 2019-01-30 VITALS — SYSTOLIC BLOOD PRESSURE: 114 MMHG | DIASTOLIC BLOOD PRESSURE: 71 MMHG

## 2019-01-30 VITALS — DIASTOLIC BLOOD PRESSURE: 65 MMHG | SYSTOLIC BLOOD PRESSURE: 99 MMHG

## 2019-01-30 VITALS — DIASTOLIC BLOOD PRESSURE: 71 MMHG | SYSTOLIC BLOOD PRESSURE: 112 MMHG

## 2019-01-30 VITALS — SYSTOLIC BLOOD PRESSURE: 122 MMHG | DIASTOLIC BLOOD PRESSURE: 74 MMHG

## 2019-01-30 VITALS — SYSTOLIC BLOOD PRESSURE: 116 MMHG | DIASTOLIC BLOOD PRESSURE: 71 MMHG

## 2019-01-30 LAB
ALBUMIN SERPL BCG-MCNC: 2.3 GM/DL (ref 3.2–5.2)
ALT SERPL W P-5'-P-CCNC: 26 U/L (ref 12–78)
BILIRUB CONJ SERPL-MCNC: 3.7 MG/DL (ref 0–0.2)
BILIRUB SERPL-MCNC: 7.2 MG/DL (ref 0.2–1)
BUN SERPL-MCNC: 13 MG/DL (ref 7–18)
CALCIUM SERPL-MCNC: 8.2 MG/DL (ref 8.5–10.1)
CHLORIDE SERPL-SCNC: 116 MEQ/L (ref 98–107)
CO2 SERPL-SCNC: 25 MEQ/L (ref 21–32)
CREAT SERPL-MCNC: 0.88 MG/DL (ref 0.7–1.3)
GFR SERPL CREATININE-BSD FRML MDRD: > 60 ML/MIN/{1.73_M2} (ref 56–?)
GLUCOSE SERPL-MCNC: 134 MG/DL (ref 70–100)
HCT VFR BLD AUTO: 28.9 % (ref 42–52)
HGB BLD-MCNC: 9.1 G/DL (ref 13.5–17.5)
INR PPP: 2.17
MAGNESIUM SERPL-MCNC: 2.2 MG/DL (ref 1.8–2.4)
MCH RBC QN AUTO: 40.6 PG (ref 27–33)
MCHC RBC AUTO-ENTMCNC: 31.5 G/DL (ref 32–36.5)
MCV RBC AUTO: 129 FL (ref 80–96)
PLATELET # BLD AUTO: 62 10^3/UL (ref 150–450)
POTASSIUM SERPL-SCNC: 4.6 MEQ/L (ref 3.5–5.1)
PROT SERPL-MCNC: 7.4 GM/DL (ref 6.4–8.2)
PROTHROMBIN TIME: 24.6 SECONDS (ref 12.1–14.4)
RBC # BLD AUTO: 2.24 10^6/UL (ref 4.3–6.1)
SODIUM SERPL-SCNC: 146 MEQ/L (ref 136–145)
WBC # BLD AUTO: 8 10^3/UL (ref 4–10)

## 2019-01-30 RX ADMIN — IPRATROPIUM BROMIDE AND ALBUTEROL SULFATE SCH ML: .5; 3 SOLUTION RESPIRATORY (INHALATION) at 16:07

## 2019-01-30 RX ADMIN — MINERAL OIL AND PETROLATUM SCH DOSE: 150; 830 OINTMENT OPHTHALMIC at 08:58

## 2019-01-30 RX ADMIN — SODIUM CHLORIDE, PRESERVATIVE FREE SCH ML: 5 INJECTION INTRAVENOUS at 13:22

## 2019-01-30 RX ADMIN — OXAZEPAM PRN MG: 10 CAPSULE, GELATIN COATED ORAL at 09:09

## 2019-01-30 RX ADMIN — SODIUM CHLORIDE, PRESERVATIVE FREE SCH ML: 5 INJECTION INTRAVENOUS at 22:27

## 2019-01-30 RX ADMIN — IPRATROPIUM BROMIDE AND ALBUTEROL SULFATE SCH ML: .5; 3 SOLUTION RESPIRATORY (INHALATION) at 19:34

## 2019-01-30 RX ADMIN — NICOTINE SCH PATCH: 21 PATCH, EXTENDED RELEASE TRANSDERMAL at 08:58

## 2019-01-30 RX ADMIN — FOLIC ACID SCH MG: 1 TABLET ORAL at 09:00

## 2019-01-30 RX ADMIN — LACTULOSE SCH ML: 10 SOLUTION ORAL at 05:16

## 2019-01-30 RX ADMIN — PREDNISOLONE SODIUM PHOSPHATE SCH MG: 15 SOLUTION ORAL at 08:59

## 2019-01-30 RX ADMIN — IPRATROPIUM BROMIDE AND ALBUTEROL SULFATE SCH ML: .5; 3 SOLUTION RESPIRATORY (INHALATION) at 07:37

## 2019-01-30 RX ADMIN — LACTULOSE SCH ML: 10 SOLUTION ORAL at 22:25

## 2019-01-30 RX ADMIN — ACETAMINOPHEN PRN MG: 325 TABLET ORAL at 22:26

## 2019-01-30 RX ADMIN — Medication SCH MG: at 08:59

## 2019-01-30 RX ADMIN — LACTULOSE SCH ML: 10 SOLUTION ORAL at 13:28

## 2019-01-30 RX ADMIN — MULTIPLE VITAMINS W/ MINERALS TAB SCH TAB: TAB at 09:00

## 2019-01-30 RX ADMIN — MINERAL OIL AND PETROLATUM SCH DOSE: 150; 830 OINTMENT OPHTHALMIC at 18:00

## 2019-01-30 RX ADMIN — IPRATROPIUM BROMIDE AND ALBUTEROL SULFATE SCH ML: .5; 3 SOLUTION RESPIRATORY (INHALATION) at 11:35

## 2019-01-30 RX ADMIN — SODIUM CHLORIDE, PRESERVATIVE FREE SCH ML: 5 INJECTION INTRAVENOUS at 05:17

## 2019-01-30 RX ADMIN — MINERAL OIL AND PETROLATUM SCH DOSE: 150; 830 OINTMENT OPHTHALMIC at 22:25

## 2019-01-30 NOTE — IPNPDOC
Date Seen


The patient was seen on 1/30/19.





Progress Note


Subjective: Patient seen this morning. Mentation improving since yesterday. Has 

continued in sinus rhythm for the last 24 hours. Echocardiogram completed which 

showed normal left ventricular size with mild LVH and probably normal systolic 

function. The patient is able to communicate that he is in [place] and the 

hospital and his 2015 he is unable to verbalize of why he is here for he is 

unsure. This communication significantly improved last 2 days. He is requesting 

for water or he feels really thirsty. Has been reported that the patient has 

decreased urine output the last couple of days but he's been on a pured diet 

and yesterday's urine output has increased a bit better because he was given D5W

to help with his hypernatremia. Because his mentation is improved today began 

this evening and increase by mouth intake and will monitor his fluid status. 

They also did give him Serax this morning for the patient was slightly agitated 

but it did not alter his mentation when I communicated with them. 





PHYSICAL EXAMINATION:


Vitals: (see below) 


General: No acute distress, laying comfortably in bed.


HEENT: Moist mucous membranes. Bruising over the right orbital region. eyes 

closed, sclera incterus. 


Neck: No JVD or lymphadenopathy


Cardiac: Sinus rhythm rate controlled in the 80s.  Significant ecchymosis on the

chest wall. no crepitus appreciated.


Pulm: Clear to auscultation b/l. No wheezing, rhonchi


Abd: NT/mildly distended but soft. + BS


Ext: Non-pitting edema bilateral lower extremities. No cyanosis.


Neuro: No tremor was noted on exam


Skin: Jaundice





LABORATORY DATA: See below.





IMAGING: 


CT chest on 1/20/19


IMPRESSION: 


Ground glass opacities in the right middle lobe. Pneumonia versus contusion.


Opacification of the right lower lobe bronchi. Bronchial mucous plugging in the 


left lower lobe. 


Emphysematous lung disease.


Acute mildly displaced fracture of the sternal body. Early with history.





CT abdomen and pelvis on 1/20/19


IMPRESSION: 


Mildly lobulated contour of the liver. Possible mild cirrhosis.


Fatty infiltration of the liver. 


Cholelithiasis without acute cholecystitis.


Copious stool throughout the colon.


Small supraumbilical hernia containing loops of small bowel. No evidence of 


bowel obstruction.





CT head on 1/20/19


IMPRESSION: 


No acute intracranial hemorrhage.


Mild hypodensities in the periventricular white matter which are consistent 


with chronic small vessel ischemic disease. Unchanged from prior.


Ventricles are in proportion to the degree of atrophy. Mildly increased from 


prior.





Echo: 1/28/19 Dr. Paulson





CONCLUSIONS:


1.  Study is of limited technical quality.


2.  Normal LV size with mild LVH and probably normal systolic function.


3.  Aortic sclerosis but no stenosis.


4.  Trace mitral insufficiency.


5.  Mild tricuspid insufficiency.


6.  High central venous pressure but unable to reliably estimate pulmonary 

artery pressure under





ASSESSMENT/PLAN: 





History of paroxysmal atrial fibrillation 


-per medical records in 2017 


-was previously on Eliquis. 


-b/c of chest wall ecchymosis there is concern for further bleeding


-anticoagulation held for now.


-c/w Cardizem PO with hold parameters


-Echo: Trace mitral insufficiency mild tricuspid insufficiency aortic sclerosis 

with mild left ventricular hypertrophy with normal systolic function.








AMS- alcholol withdrawl plus hepatic encephalopathy? (Improving)


-mentation not improving follows some command but still somulent during exam. 


-CT HEAD 01/27/2019: Negative for any acute changes.


-Unlikely wernicke encephalopathy -on thiamine and dextrose


-Is tolerating his pured honey thickened diet. 


-Continue with lactulose 30 ML's every 8 hours and hold if bowel movements 

greater than 3.


-c/w Serax when necessary for agitation


-Possible new baseline unsure if the patient needs long-term placement or his 

wife can take care of him


-Neurology consulted and EEG NEGATIVE








Cirrhosis, with bili 7, INR 1.9, meld score 22, 19.6% mortality rate in the next

3 months


-Will need appropriate diuresis, as blood pressure tolerates. 


-Steven Rosario consulted. 


-active alcohol use not a candidate for liver transplant referral


-[facility] discriminant factor: 75.5 poor prognosis, b/c >32 would benefit from p

rednisolone 40qd [id number] (start date 01/29/2019) then taper for 2 weeks. 


-Continue with lactulose 30 ML's every 8 hours and hold if bowel movements 

greater than 3.


-Abdominal ultrasound 01/27/2019:consistent with cirrhosis





Hypernatremia hypovolemia


-inproing with D5 W


-Will continue with D5W 50cc/hr for 500CC


-Continue to monitor sodium status


-Will need to monitor urine output and see if the patient can be encouraged with

good by mouth intake 


-If does not improve only to consider nephrology consult


   








Coagulopathy


-secondary to underlying cirrhosis with progressive liver failure


-worsening INR while on Vitamin K.


-Stop Vitamin K - no benefit


-This could be since of worsening liver function and prognosis is guarded. 


-s/p FFP/vitamin K.





Alcohol abuse


-will need appropriate counseling upon improvement as patient is altered at this

time. 


-discontinue Ativan


Serax 10 mg every 6 when necessary for agitation





Thrombocytopenia (Stable)


-likely secondary to cirrhosis. s/p 1 unit of platelets.





Hypoxia/ Pulmonary contusion/aspiration pneumonia(resolved)


-Completed seven-day course of moxifloxacin


-oxygen as needed. 





Chest wall ecchymosis/sternal fracture- 


-Surgery consulted and recommendated for fracture is pain control and incentive 

spirometer. No surgical intervention at this time. 


-s/p platelets/FFP given ecchymosis - 2 units 


-H/H stable 


-Continue to monitor.





H/o COPD -


-c/w nebs 





PT -consulted please evaluate and treat





DVT prophylaxis-SCDs





Overall prognosis guarded. 





Speech therapy recommended by mouth pured solids and honey thickened liquids. 





CODE STATUS: DNR/DNI.





VS, I&O, 24H, Fishbone


Vital Signs/I&O





Vital Signs








  Date Time  Temp Pulse Resp B/P (MAP) Pulse Ox O2 Delivery O2 Flow Rate FiO2


 


1/30/19 06:00  66  99/65    


 


1/30/19 04:00 98.9  13  99 Room Air  














I&O- Last 24 Hours up to 6 AM 


 


 1/30/19





 06:00


 


Intake Total 2065 ml


 


Output Total 625 ml


 


Balance 1440 ml











Laboratory Data


24H LABS


Laboratory Tests 2


1/29/19 14:04: 


Anion Gap 6L, Glomerular Filtration Rate > 60.0, Blood Urea Nitrogen 16, 

Creatinine 0.96, Sodium Level 148H, Potassium Level 4.0, Chloride Level 117H, 

Carbon Dioxide Level 25, Calcium Level 8.5


1/30/19 04:36: 


Anion Gap 5L, Glomerular Filtration Rate > 60.0, Calcium Level 8.2L, Nucleated 

Red Blood Cells % (auto) 0.6H, Immature Platelet Fraction 3.9, Prothrombin Time 

24.6H, Prothromb Time International Ratio 2.17, Magnesium Level 2.2, Aspartate 

Amino Transf (AST/SGOT) 36, Alanine Aminotransferase (ALT/SGPT) 26, Alkaline 

Phosphatase 149H, Total Bilirubin 7.2H, Direct Bilirubin 3.7H, Total Protein 

7.4, Albumin 2.3L, Albumin/Globulin Ratio 0.45L


CBC/BMP


Laboratory Tests


1/29/19 14:04








Calcium Level 8.5





1/30/19 04:36








Red Blood Count 2.24 L, Mean Corpuscular Volume 129.0 H, Mean Corpuscular 

Hemoglobin 40.6 H, Mean Corpuscular Hemoglobin Concent 31.5 L, Red Cell Dist

ribution Width 14.9 H


Microbiology





Microbiology


1/27/19 Blood Culture - Preliminary, Resulted


          No Growth after 72 hours. All specime...


1/27/19 Blood Culture - Preliminary, Resulted


          No Growth after 72 hours. All specime...


1/20/19 Blood Culture - Final, Complete


          NO GROWTH AFTER 5 DAYS


1/20/19 Blood Culture - Final, Complete


          NO GROWTH AFTER 5 DAYS


1/27/19 Urine Culture - Final, Complete





GME ATTESTATION


GME ATTESTATION


My faculty preceptor for this patient encounter was physically present during 

the encounter and was fully available. All aspects of the patient interview, 

examination, medical decision making process, and medical care plan development 

were reviewed and approved by the faculty preceptor. The faculty preceptor is 

aware and concurs with the plan as stated in the body of this note and will 

attest to such by his/her cosignature.





ATTENDING NOTE





I have both independently examined this patient as well as reviewed the note  I 

have discussed in detail the findings and plan of treatment as documented in the

note. I will continue to follow the patient and offer further guidance to the 

patients care as necessary during this hospital stay.





MARIA DE JESUS Reese MD, DO       Jan 30, 2019 08:47


HANANE CIFUENTES MD                       Feb 2, 2019 11:04

## 2019-01-31 VITALS — SYSTOLIC BLOOD PRESSURE: 124 MMHG | DIASTOLIC BLOOD PRESSURE: 75 MMHG

## 2019-01-31 VITALS — SYSTOLIC BLOOD PRESSURE: 107 MMHG | DIASTOLIC BLOOD PRESSURE: 65 MMHG

## 2019-01-31 LAB
ALBUMIN SERPL BCG-MCNC: 2.3 GM/DL (ref 3.2–5.2)
ALT SERPL W P-5'-P-CCNC: 36 U/L (ref 12–78)
BILIRUB CONJ SERPL-MCNC: 3.4 MG/DL (ref 0–0.2)
BILIRUB SERPL-MCNC: 5.3 MG/DL (ref 0.2–1)
BUN SERPL-MCNC: 13 MG/DL (ref 7–18)
CALCIUM SERPL-MCNC: 8.2 MG/DL (ref 8.5–10.1)
CHLORIDE SERPL-SCNC: 110 MEQ/L (ref 98–107)
CO2 SERPL-SCNC: 26 MEQ/L (ref 21–32)
CREAT SERPL-MCNC: 0.74 MG/DL (ref 0.7–1.3)
GFR SERPL CREATININE-BSD FRML MDRD: > 60 ML/MIN/{1.73_M2} (ref 56–?)
GLUCOSE SERPL-MCNC: 85 MG/DL (ref 70–100)
HCT VFR BLD AUTO: 28.7 % (ref 42–52)
HGB BLD-MCNC: 9.2 G/DL (ref 13.5–17.5)
INR PPP: 2.15
MAGNESIUM SERPL-MCNC: 1.9 MG/DL (ref 1.8–2.4)
MCH RBC QN AUTO: 39.8 PG (ref 27–33)
MCHC RBC AUTO-ENTMCNC: 32.1 G/DL (ref 32–36.5)
MCV RBC AUTO: 124.2 FL (ref 80–96)
PLATELET # BLD AUTO: 71 10^3/UL (ref 150–450)
POTASSIUM SERPL-SCNC: 3.4 MEQ/L (ref 3.5–5.1)
PROT SERPL-MCNC: 7.4 GM/DL (ref 6.4–8.2)
PROTHROMBIN TIME: 24.4 SECONDS (ref 12.1–14.4)
RBC # BLD AUTO: 2.31 10^6/UL (ref 4.3–6.1)
SODIUM SERPL-SCNC: 142 MEQ/L (ref 136–145)
WBC # BLD AUTO: 8.2 10^3/UL (ref 4–10)

## 2019-01-31 RX ADMIN — MINERAL OIL AND PETROLATUM SCH DOSE: 150; 830 OINTMENT OPHTHALMIC at 09:21

## 2019-01-31 RX ADMIN — MULTIPLE VITAMINS W/ MINERALS TAB SCH TAB: TAB at 09:19

## 2019-01-31 RX ADMIN — IPRATROPIUM BROMIDE AND ALBUTEROL SULFATE SCH ML: .5; 3 SOLUTION RESPIRATORY (INHALATION) at 11:15

## 2019-01-31 RX ADMIN — IPRATROPIUM BROMIDE AND ALBUTEROL SULFATE SCH ML: .5; 3 SOLUTION RESPIRATORY (INHALATION) at 15:21

## 2019-01-31 RX ADMIN — OXAZEPAM PRN MG: 10 CAPSULE, GELATIN COATED ORAL at 12:06

## 2019-01-31 RX ADMIN — SODIUM CHLORIDE, PRESERVATIVE FREE SCH ML: 5 INJECTION INTRAVENOUS at 14:00

## 2019-01-31 RX ADMIN — LACTULOSE SCH ML: 10 SOLUTION ORAL at 05:15

## 2019-01-31 RX ADMIN — SODIUM CHLORIDE, PRESERVATIVE FREE SCH ML: 5 INJECTION INTRAVENOUS at 05:17

## 2019-01-31 RX ADMIN — SODIUM CHLORIDE, PRESERVATIVE FREE SCH ML: 5 INJECTION INTRAVENOUS at 22:41

## 2019-01-31 RX ADMIN — NICOTINE SCH PATCH: 21 PATCH, EXTENDED RELEASE TRANSDERMAL at 09:21

## 2019-01-31 RX ADMIN — IPRATROPIUM BROMIDE AND ALBUTEROL SULFATE SCH ML: .5; 3 SOLUTION RESPIRATORY (INHALATION) at 20:40

## 2019-01-31 RX ADMIN — IPRATROPIUM BROMIDE AND ALBUTEROL SULFATE SCH ML: .5; 3 SOLUTION RESPIRATORY (INHALATION) at 07:11

## 2019-01-31 RX ADMIN — Medication SCH MG: at 09:21

## 2019-01-31 RX ADMIN — FOLIC ACID SCH MG: 1 TABLET ORAL at 09:21

## 2019-01-31 RX ADMIN — PREDNISOLONE SODIUM PHOSPHATE SCH MG: 15 SOLUTION ORAL at 09:21

## 2019-01-31 RX ADMIN — MINERAL OIL AND PETROLATUM SCH DOSE: 150; 830 OINTMENT OPHTHALMIC at 22:41

## 2019-01-31 RX ADMIN — OXAZEPAM PRN MG: 10 CAPSULE, GELATIN COATED ORAL at 05:16

## 2019-01-31 RX ADMIN — MINERAL OIL AND PETROLATUM SCH DOSE: 150; 830 OINTMENT OPHTHALMIC at 16:00

## 2019-01-31 NOTE — IPNPDOC
Date Seen


The patient was seen on 1/31/19.





Progress Note


Subjective: Patient seen this morning. Mentation continues to improve but he is 

not speaking appropriately such as he stated that he would like to have a 

discussion about his chest and back and then would not elaborate further. He 

continues to dose off/zone out. He will start to work with PT this morning. 

There was no overnight events reported except he did pull out his condom 

catheter this morning. The patient was unable to participate in the review of 

systems this morning as well.





PHYSICAL EXAMINATION:


Vitals: (see below) 


General: No acute distress, laying comfortably in bed.


HEENT: Moist mucous membranes.  sclera incterus. 


Neck: No JVD or lymphadenopathy


Cardiac: Sinus rhythm rate. Healing ecchymosis on the chest wall. no crepitus 

appreciated.


Pulm: Clear to auscultation b/l. No wheezing, rhonchi


Abd: NT/mildly distended but soft. + BS


Ext: Non-pitting edema bilateral lower extremities. No cyanosis.


Neuro: Slight tremors appreciated in upper and lower extremities


Skin: Jaundice





LABORATORY DATA: See below.





IMAGING: 


CT chest on 1/20/19


IMPRESSION: 


Ground glass opacities in the right middle lobe. Pneumonia versus contusion.


Opacification of the right lower lobe bronchi. Bronchial mucous plugging in the 


left lower lobe. 


Emphysematous lung disease.


Acute mildly displaced fracture of the sternal body. Early with history.





CT abdomen and pelvis on 1/20/19


IMPRESSION: 


Mildly lobulated contour of the liver. Possible mild cirrhosis.


Fatty infiltration of the liver. 


Cholelithiasis without acute cholecystitis.


Copious stool throughout the colon.


Small supraumbilical hernia containing loops of small bowel. No evidence of 


bowel obstruction.





CT head on 1/20/19


IMPRESSION: 


No acute intracranial hemorrhage.


Mild hypodensities in the periventricular white matter which are consistent 


with chronic small vessel ischemic disease. Unchanged from prior.


Ventricles are in proportion to the degree of atrophy. Mildly increased from 


prior.





Echo: 1/28/19 Dr. Paulson





CONCLUSIONS:


1.  Study is of limited technical quality.


2.  Normal LV size with mild LVH and probably normal systolic function.


3.  Aortic sclerosis but no stenosis.


4.  Trace mitral insufficiency.


5.  Mild tricuspid insufficiency.


6.  High central venous pressure but unable to reliably estimate pulmonary 

artery pressure under





ASSESSMENT/PLAN: 





-A. fib with RVR (resolved


-History of paroxysmal atrial fibrillation


-per medical records in 2017 


-was previously on Eliquis. 


-b/c of chest wall ecchymosis there is concern for further bleeding


-anticoagulation held for now.


-c/w Cardizem PO with hold parameters


-Echo: Trace mitral insufficiency mild tricuspid insufficiency aortic sclerosis 

with mild left ventricular hypertrophy with normal systolic function.








AMS- alcholol withdrawl plus hepatic encephalopathy? (Improving)


-mentation not improving follows some command but still somulent during exam. 


-CT HEAD 01/27/2019: Negative for any acute changes.


-Unlikely wernicke encephalopathy -on thiamine and dextrose


-Is tolerating his pured honey thickened diet. 


-Continue with lactulose 30 ML's every 8 hours and hold if bowel movements 

greater than 3.


-c/w Serax when necessary for agitation


-Possible new baseline unsure if the patient needs long-term placement or his 

wife can take care of him


-Neurology consulted and EEG NEGATIVE








Cirrhosis, with bili 7, INR 1.9, meld score 22, 19.6% mortality rate in the next

3 months


-Will need appropriate diuresis, as blood pressure tolerates. 


-Steven Rosario consulted. 


-active alcohol use not a candidate for liver transplant referral


-[facility] discriminant factor: 75.5 poor prognosis, b/c >32 would benefit from 

prednisolone 40qd [id number] (start date 01/29/2019) then taper for 2 weeks. 


-Continue with lactulose 30 ML's every 8 hours and hold if bowel movements 

greater than 3 


-Abdominal ultrasound 01/27/2019:consistent with cirrhosis





Hypernatremia hypovolemia (Resolved)


-Continue to monitor sodium status


-Improved PO intake





Coagulopathy


-secondary to underlying cirrhosis with progressive liver failure


-This could be since of worsening liver function and prognosis is guarded. 


-s/p FFP/vitamin K.





Alcohol abuse


-will need appropriate counseling upon improvement as patient is altered at this

time. 


-discontinue Ativan


Serax 10 mg every 6 when necessary for agitation





Thrombocytopenia (Stable)


-likely secondary to cirrhosis. s/p 1 unit of platelets.





Hypoxia/ Pulmonary contusion/aspiration pneumonia(resolved)


-Completed seven-day course of moxifloxacin


-oxygen as needed. 





Chest wall ecchymosis/sternal fracture- 


-Surgery consulted and recommendated for fracture is pain control and incentive 

spirometer. No surgical intervention at this time. 


-s/p platelets/FFP given ecchymosis - 2 units 


-H/H stable 


-Continue to monitor.





H/o COPD -


-c/w nebs 





PT -consulted please evaluate and treat





DVT prophylaxis-SCDs





Overall prognosis guarded. 





Speech therapy recommended by mouth pured solids and honey thickened liquids. 





CODE STATUS: DNR/DNI.





Disposition: Possibly will need rehabilitation will await PT recommendations





VS, I&O, 24H, Fishbone


Vital Signs/I&O





Vital Signs








  Date Time  Temp Pulse Resp B/P (MAP) Pulse Ox O2 Delivery O2 Flow Rate FiO2


 


1/31/19 06:00 98.1 68 19 124/75 (91) 96   


 


1/30/19 04:00      Room Air  














I&O- Last 24 Hours up to 6 AM 


 


 1/31/19





 06:00


 


Intake Total 1150 ml


 


Output Total 925 ml


 


Balance 225 ml











Laboratory Data


24H LABS


Laboratory Tests 2


1/31/19 06:10: 


Nucleated Red Blood Cells % (auto) 0.2H, Immature Platelet Fraction 5.2, 

Prothrombin Time 24.4H, Prothromb Time International Ratio 2.15, Anion Gap 6L, 

Glomerular Filtration Rate > 60.0, Calcium Level 8.2L, Magnesium Level 1.9, 

Aspartate Amino Transf (AST/SGOT) 51H, Alanine Aminotransferase (ALT/SGPT) 36, 

Alkaline Phosphatase 167H, Total Bilirubin 5.3H, Direct Bilirubin 3.4H, Total 

Protein 7.4, Albumin 2.3L, Albumin/Globulin Ratio 0.45L


CBC/BMP


Laboratory Tests


1/31/19 06:10








Red Blood Count 2.31 L, Mean Corpuscular Volume 124.2 H, Mean Corpuscular 

Hemoglobin 39.8 H, Mean Corpuscular Hemoglobin Concent 32.1, Red Cell 

Distribution Width 14.5


Microbiology





Microbiology


1/27/19 Blood Culture - Preliminary, Resulted


          No Growth after 72 hours. All specime...


1/27/19 Blood Culture - Preliminary, Resulted


          No Growth after 72 hours. All specime...


1/27/19 Urine Culture - Final, Complete





GME ATTESTATION


GME ATTESTATION


My faculty preceptor for this patient encounter was physically present during 

the encounter and was fully available. All aspects of the patient interview, 

examination, medical decision making process, and medical care plan development 

were reviewed and approved by the faculty preceptor. The faculty preceptor is 

aware and concurs with the plan as stated in the body of this note and will 

attest to such by his/her cosignature.





ATTENDING NOTE





I have both independently examined this patient as well as reviewed the note  I 

have discussed in detail the findings and plan of treatment as documented in the

note. I will continue to follow the patient and offer further guidance to the 

patients care as necessary during this hospital stay.





MARIA DE JESUS Reese MD, DO       Jan 31, 2019 10:11


HANANE CIFUENTES MD                       Feb 2, 2019 11:06

## 2019-02-01 VITALS — SYSTOLIC BLOOD PRESSURE: 130 MMHG | DIASTOLIC BLOOD PRESSURE: 86 MMHG

## 2019-02-01 VITALS — DIASTOLIC BLOOD PRESSURE: 69 MMHG | SYSTOLIC BLOOD PRESSURE: 103 MMHG

## 2019-02-01 VITALS — SYSTOLIC BLOOD PRESSURE: 103 MMHG | DIASTOLIC BLOOD PRESSURE: 69 MMHG

## 2019-02-01 VITALS — SYSTOLIC BLOOD PRESSURE: 120 MMHG | DIASTOLIC BLOOD PRESSURE: 68 MMHG

## 2019-02-01 LAB
ALBUMIN SERPL BCG-MCNC: 2.1 GM/DL (ref 3.2–5.2)
ALT SERPL W P-5'-P-CCNC: 48 U/L (ref 12–78)
BILIRUB CONJ SERPL-MCNC: 3 MG/DL (ref 0–0.2)
BILIRUB SERPL-MCNC: 4.7 MG/DL (ref 0.2–1)
BUN SERPL-MCNC: 13 MG/DL (ref 7–18)
CALCIUM SERPL-MCNC: 8 MG/DL (ref 8.5–10.1)
CHLORIDE SERPL-SCNC: 109 MEQ/L (ref 98–107)
CO2 SERPL-SCNC: 28 MEQ/L (ref 21–32)
CREAT SERPL-MCNC: 0.69 MG/DL (ref 0.7–1.3)
GFR SERPL CREATININE-BSD FRML MDRD: > 60 ML/MIN/{1.73_M2} (ref 56–?)
GLUCOSE SERPL-MCNC: 75 MG/DL (ref 70–100)
HCT VFR BLD AUTO: 29.4 % (ref 42–52)
HGB BLD-MCNC: 9.4 G/DL (ref 13.5–17.5)
INR PPP: 2.18
MAGNESIUM SERPL-MCNC: 1.8 MG/DL (ref 1.8–2.4)
MCH RBC QN AUTO: 40.7 PG (ref 27–33)
MCHC RBC AUTO-ENTMCNC: 32 G/DL (ref 32–36.5)
MCV RBC AUTO: 127.3 FL (ref 80–96)
PLATELET # BLD AUTO: 77 10^3/UL (ref 150–450)
POTASSIUM SERPL-SCNC: 3.7 MEQ/L (ref 3.5–5.1)
PROT SERPL-MCNC: 7.1 GM/DL (ref 6.4–8.2)
PROTHROMBIN TIME: 24.7 SECONDS (ref 12.1–14.4)
RBC # BLD AUTO: 2.31 10^6/UL (ref 4.3–6.1)
SODIUM SERPL-SCNC: 142 MEQ/L (ref 136–145)
WBC # BLD AUTO: 7.3 10^3/UL (ref 4–10)

## 2019-02-01 RX ADMIN — MINERAL OIL AND PETROLATUM SCH DOSE: 150; 830 OINTMENT OPHTHALMIC at 09:00

## 2019-02-01 RX ADMIN — PREDNISOLONE SODIUM PHOSPHATE SCH MG: 15 SOLUTION ORAL at 09:57

## 2019-02-01 RX ADMIN — SODIUM CHLORIDE, PRESERVATIVE FREE SCH ML: 5 INJECTION INTRAVENOUS at 14:00

## 2019-02-01 RX ADMIN — IPRATROPIUM BROMIDE AND ALBUTEROL SULFATE SCH ML: .5; 3 SOLUTION RESPIRATORY (INHALATION) at 15:18

## 2019-02-01 RX ADMIN — Medication SCH MG: at 09:56

## 2019-02-01 RX ADMIN — NICOTINE SCH PATCH: 21 PATCH, EXTENDED RELEASE TRANSDERMAL at 09:56

## 2019-02-01 RX ADMIN — IPRATROPIUM BROMIDE AND ALBUTEROL SULFATE SCH ML: .5; 3 SOLUTION RESPIRATORY (INHALATION) at 20:51

## 2019-02-01 RX ADMIN — SODIUM CHLORIDE, PRESERVATIVE FREE SCH ML: 5 INJECTION INTRAVENOUS at 20:38

## 2019-02-01 RX ADMIN — MULTIPLE VITAMINS W/ MINERALS TAB SCH TAB: TAB at 09:56

## 2019-02-01 RX ADMIN — SODIUM CHLORIDE, PRESERVATIVE FREE SCH ML: 5 INJECTION INTRAVENOUS at 06:42

## 2019-02-01 RX ADMIN — IPRATROPIUM BROMIDE AND ALBUTEROL SULFATE SCH ML: .5; 3 SOLUTION RESPIRATORY (INHALATION) at 08:07

## 2019-02-01 RX ADMIN — FOLIC ACID SCH MG: 1 TABLET ORAL at 09:56

## 2019-02-01 RX ADMIN — OXAZEPAM PRN MG: 10 CAPSULE, GELATIN COATED ORAL at 04:01

## 2019-02-01 RX ADMIN — LACTULOSE SCH ML: 10 SOLUTION ORAL at 22:00

## 2019-02-01 RX ADMIN — IPRATROPIUM BROMIDE AND ALBUTEROL SULFATE SCH ML: .5; 3 SOLUTION RESPIRATORY (INHALATION) at 11:58

## 2019-02-01 RX ADMIN — LACTULOSE SCH ML: 10 SOLUTION ORAL at 14:00

## 2019-02-01 NOTE — IPNPDOC
Date Seen


The patient was seen on 2/1/19.





Progress Note


Subjective: Patient seen this morning. Mentation continues to improve but he is 

not speaking appropriately today he states he is in [facility] and its 

feb 2022. He states he in here because he hasn't been taking care of himself. He

believes the sitter in his room is his wife. The patient has been actively 

trying to get out of his bed but because he is so week he is unable to truly get

out of bed. A sitter has been placed in the room. He is also continue to work 

with PT. Will need to await further recommendations 





PHYSICAL EXAMINATION:


Vitals: (see below) 


General: No acute distress, laying comfortably in bed.


HEENT: Moist mucous membranes.  sclera incterus. 


Neck: No JVD or lymphadenopathy


Cardiac: Sinus rhythm rate. Healing ecchymosis on the chest wall. no crepitus 

appreciated.


Pulm: Clear to auscultation b/l. No wheezing, rhonchi


Abd: NT/mildly distended but soft. + BS


Ext: Non-pitting edema bilateral lower extremities. No cyanosis.


Neuro: Slight tremors appreciated in upper and lower extremities


Skin: Jaundice





LABORATORY DATA: See below.





IMAGING: 


CT chest on 1/20/19


IMPRESSION: 


Ground glass opacities in the right middle lobe. Pneumonia versus contusion.


Opacification of the right lower lobe bronchi. Bronchial mucous plugging in the 


left lower lobe. 


Emphysematous lung disease.


Acute mildly displaced fracture of the sternal body. Early with history.





CT abdomen and pelvis on 1/20/19


IMPRESSION: 


Mildly lobulated contour of the liver. Possible mild cirrhosis.


Fatty infiltration of the liver. 


Cholelithiasis without acute cholecystitis.


Copious stool throughout the colon.


Small supraumbilical hernia containing loops of small bowel. No evidence of 


bowel obstruction.





CT head on 1/20/19


IMPRESSION: 


No acute intracranial hemorrhage.


Mild hypodensities in the periventricular white matter which are consistent 


with chronic small vessel ischemic disease. Unchanged from prior.


Ventricles are in proportion to the degree of atrophy. Mildly increased from 


prior.





Echo: 1/28/19 Dr. Paulson





CONCLUSIONS:


1.  Study is of limited technical quality.


2.  Normal LV size with mild LVH and probably normal systolic function.


3.  Aortic sclerosis but no stenosis.


4.  Trace mitral insufficiency.


5.  Mild tricuspid insufficiency.


6.  High central venous pressure but unable to reliably estimate pulmonary 

artery pressure under





ASSESSMENT/PLAN: 





-A. fib with RVR (resolved) sinus rhythm


-History of paroxysmal atrial fibrillation


-per medical records in 2017 


-was previously on Eliquis. 


-anticoagulation not indicated. [id number] VASc - 0


-c/w Cardizem CD 120MG  for rhythm and rate control


-Echo: Trace mitral insufficiency mild tricuspid insufficiency aortic sclerosis 

with mild left ventricular hypertrophy with normal systolic function.








AMS- alcholol withdrawl plus hepatic encephalopathy/alcohol hepatitis


-CT HEAD 01/27/2019: Negative for any acute changes.


-Continue with lactulose 30 ML's every 8 hours and hold if bowel movements 

greater than 3.


-c/w Serax when necessary for agitation


-Possible new baseline unsure if the patient needs long-term placement or his 

wife can take care of him


-Neurology consulted 


-EEG NEGATIVE








Cirrhosis, with bili 7, INR 1.9, meld score 22, 19.6% mortality rate in the next

3 months


-Will need appropriate diuresis, as blood pressure tolerates. 


-Steven Rosario consulted. 


-active alcohol use not a candidate for liver transplant referral


-[facility] discriminant factor: 75.5 poor prognosis, b/c >32 would benefit from 

prednisolone 40qd [id number] (start date 01/29/2019) then taper for 2 weeks. 


-Continue with lactulose 30 ML's q8h


-Abdominal ultrasound 01/27/2019:consistent with cirrhosis





Hypernatremia hypovolemia (Resolved)


-Continue to monitor sodium status


-Improved PO intake





Coagulopathy


-secondary to underlying cirrhosis with progressive liver failure


-This could be since of worsening liver function and prognosis is guarded. 


-s/p FFP/vitamin K.





Alcohol abuse


-will need appropriate counseling upon improvement as patient is altered at this

time. 


-discontinue Ativan


Serax 10 mg every 6 when necessary for agitation





Thrombocytopenia (Stable)


-likely secondary to cirrhosis. s/p 1 unit of platelets.





Hypoxia/ Pulmonary contusion/aspiration pneumonia(resolved)


-Completed seven-day course of moxifloxacin


-oxygen as needed. 





Chest wall ecchymosis/sternal fracture- 


-Surgery consulted and recommendated for fracture is pain control and incentive 

spirometer. No surgical intervention at this time. 


-s/p platelets/FFP given ecchymosis - 2 units 


-H/H stable 


-Continue to monitor.





H/o COPD -


c/w nebs 





PT


-continue to work and treat. 


-possible rehab?





DVT prophylaxis-SCDs





Overall prognosis guarded. 





Speech therapy recommended by mouth pured solids and honey thickened liquids. 





CODE STATUS: DNR/DNI.





Disposition: Possibly will need rehabilitation will await PT recommendations





VS, I&O, 24H, Fishbone


Vital Signs/I&O





Vital Signs








  Date Time  Temp Pulse Resp B/P (MAP) Pulse Ox O2 Delivery O2 Flow Rate FiO2


 


2/1/19 06:42  69  130/86    


 


2/1/19 06:00 97.5  18  99   


 


1/30/19 04:00      Room Air  














I&O- Last 24 Hours up to 6 AM 


 


 2/1/19





 06:00


 


Intake Total 780 ml


 


Output Total 0 ml


 


Balance 780 ml











Laboratory Data


24H LABS


Laboratory Tests 2


2/1/19 06:22: 


Nucleated Red Blood Cells % (auto) 0.0, Immature Platelet Fraction 4.4, 

Prothrombin Time 24.7H, Prothromb Time International Ratio 2.18, Anion Gap 5L, 

Glomerular Filtration Rate > 60.0, Calcium Level 8.0L, Magnesium Level 1.8, 

Aspartate Amino Transf (AST/SGOT) 59H, Alanine Aminotransferase (ALT/SGPT) 48, 

Alkaline Phosphatase 175H, Total Bilirubin 4.7H, Direct Bilirubin 3.0H, Total 

Protein 7.1, Albumin 2.1L, Albumin/Globulin Ratio 0.42L


CBC/BMP


Laboratory Tests


2/1/19 06:22








Red Blood Count 2.31 L, Mean Corpuscular Volume 127.3 H, Mean Corpuscular 

Hemoglobin 40.7 H, Mean Corpuscular Hemoglobin Concent 32.0, Red Cell 

Distribution Width 14.9 H


Microbiology





Microbiology


1/27/19 Blood Culture - Final, Complete


          NO GROWTH AFTER 5 DAYS


1/27/19 Blood Culture - Final, Complete


          NO GROWTH AFTER 5 DAYS


1/27/19 Urine Culture - Final, Complete





GME ATTESTATION


GME ATTESTATION


My faculty preceptor for this patient encounter was physically present during 

the encounter and was fully available. All aspects of the patient interview, 

examination, medical decision making process, and medical care plan development 

were reviewed and approved by the faculty preceptor. The faculty preceptor is 

aware and concurs with the plan as stated in the body of this note and will 

attest to such by his/her cosignature.





ATTENDING NOTE





I have both independently examined this patient as well as reviewed the note  I 

have discussed in detail the findings and plan of treatment as documented in the

note. I will continue to follow the patient and offer further guidance to the 

patients care as necessary during this hospital stay.





MARIA DE JESUS Reese MD, DO        Feb 1, 2019 09:43


HANANE CIFUENTES MD                       Feb 2, 2019 11:08

## 2019-02-02 VITALS — SYSTOLIC BLOOD PRESSURE: 122 MMHG | DIASTOLIC BLOOD PRESSURE: 70 MMHG

## 2019-02-02 VITALS — DIASTOLIC BLOOD PRESSURE: 67 MMHG | SYSTOLIC BLOOD PRESSURE: 109 MMHG

## 2019-02-02 LAB
ALBUMIN SERPL BCG-MCNC: 2.1 GM/DL (ref 3.2–5.2)
ALT SERPL W P-5'-P-CCNC: 52 U/L (ref 12–78)
BILIRUB CONJ SERPL-MCNC: 3.2 MG/DL (ref 0–0.2)
BILIRUB SERPL-MCNC: 5.5 MG/DL (ref 0.2–1)
BUN SERPL-MCNC: 11 MG/DL (ref 7–18)
CALCIUM SERPL-MCNC: 8.2 MG/DL (ref 8.5–10.1)
CHLORIDE SERPL-SCNC: 108 MEQ/L (ref 98–107)
CO2 SERPL-SCNC: 26 MEQ/L (ref 21–32)
CREAT SERPL-MCNC: 0.69 MG/DL (ref 0.7–1.3)
GFR SERPL CREATININE-BSD FRML MDRD: > 60 ML/MIN/{1.73_M2} (ref 56–?)
GLUCOSE SERPL-MCNC: 74 MG/DL (ref 70–100)
HCT VFR BLD AUTO: 31 % (ref 42–52)
HGB BLD-MCNC: 10.2 G/DL (ref 13.5–17.5)
INR PPP: 2.06
MAGNESIUM SERPL-MCNC: 1.9 MG/DL (ref 1.8–2.4)
MCH RBC QN AUTO: 40.6 PG (ref 27–33)
MCHC RBC AUTO-ENTMCNC: 32.9 G/DL (ref 32–36.5)
MCV RBC AUTO: 123.5 FL (ref 80–96)
PLATELET # BLD AUTO: 77 10^3/UL (ref 150–450)
POTASSIUM SERPL-SCNC: 3.6 MEQ/L (ref 3.5–5.1)
PROT SERPL-MCNC: 7.2 GM/DL (ref 6.4–8.2)
PROTHROMBIN TIME: 23.6 SECONDS (ref 12.1–14.4)
RBC # BLD AUTO: 2.51 10^6/UL (ref 4.3–6.1)
SODIUM SERPL-SCNC: 140 MEQ/L (ref 136–145)
WBC # BLD AUTO: 6.3 10^3/UL (ref 4–10)

## 2019-02-02 RX ADMIN — IPRATROPIUM BROMIDE AND ALBUTEROL SULFATE SCH ML: .5; 3 SOLUTION RESPIRATORY (INHALATION) at 08:22

## 2019-02-02 RX ADMIN — SODIUM CHLORIDE, PRESERVATIVE FREE SCH ML: 5 INJECTION INTRAVENOUS at 22:00

## 2019-02-02 RX ADMIN — IPRATROPIUM BROMIDE AND ALBUTEROL SULFATE SCH ML: .5; 3 SOLUTION RESPIRATORY (INHALATION) at 12:00

## 2019-02-02 RX ADMIN — SODIUM CHLORIDE, PRESERVATIVE FREE SCH ML: 5 INJECTION INTRAVENOUS at 06:17

## 2019-02-02 RX ADMIN — LACTULOSE SCH ML: 10 SOLUTION ORAL at 06:16

## 2019-02-02 RX ADMIN — FOLIC ACID SCH MG: 1 TABLET ORAL at 09:17

## 2019-02-02 RX ADMIN — IPRATROPIUM BROMIDE AND ALBUTEROL SULFATE SCH ML: .5; 3 SOLUTION RESPIRATORY (INHALATION) at 15:52

## 2019-02-02 RX ADMIN — MULTIPLE VITAMINS W/ MINERALS TAB SCH TAB: TAB at 09:17

## 2019-02-02 RX ADMIN — PREDNISOLONE SODIUM PHOSPHATE SCH MG: 15 SOLUTION ORAL at 09:18

## 2019-02-02 RX ADMIN — IPRATROPIUM BROMIDE AND ALBUTEROL SULFATE SCH ML: .5; 3 SOLUTION RESPIRATORY (INHALATION) at 20:43

## 2019-02-02 RX ADMIN — NICOTINE SCH PATCH: 21 PATCH, EXTENDED RELEASE TRANSDERMAL at 09:17

## 2019-02-02 RX ADMIN — SODIUM CHLORIDE, PRESERVATIVE FREE SCH ML: 5 INJECTION INTRAVENOUS at 17:33

## 2019-02-02 RX ADMIN — Medication SCH MG: at 09:17

## 2019-02-02 NOTE — IPNPDOC
Date Seen


The patient was seen on 2/2/19.





Progress Note


Subjective: Patient seen this morning. Mentation continues to improve but he is 

not speaking appropriately today he states he is in [facility] and its 2012. 

Patient's really not oriented to place still and reported that the patient is 

having tremors in his lower extremities. When communicating with the patient is 

still present but not as prominent and the patient is able to communicate infor

mation when asked, the information is correct. Is not a change from his baseline

in the last 48 hours. He has worked with PT and has been recommended that he 

needs continued rehabilitation possibly at .  is trying to work 

with family to establish care. No other events reported overnight. The last time

patient needed Serax was 47 hours prior. He does have it on board for when 

necessary agitation. Patient unable to participate in review of system.  Dr. Cifuentes 

discussed with wife the prognosis is guarded for the patient. We have done 

everything to address all medical issue. She would like to make him Comfort 

Measures Only. MOLST Form in the chart signed by wife, HCP, and Dr. Cifuentes.  





PHYSICAL EXAMINATION:


Vitals: (see below) 


General: No acute distress, laying comfortably in bed.


HEENT: Moist mucous membranes.  sclera incterus. 


Neck: No JVD or lymphadenopathy


Cardiac: Sinus rhythm rate. Healing ecchymosis on the chest wall. no crepitus 

appreciated.


Pulm: Clear to auscultation b/l. No wheezing, rhonchi


Abd: NT/mildly distended but soft. + BS


Ext: Non-pitting edema bilateral lower extremities. No cyanosis.


Neuro: Slight tremors appreciated in upper and lower extremities. Diminished 

muscle strength in the lower extremity


Skin: Jaundice





LABORATORY DATA: See below.





IMAGING: 


CT chest on 1/20/19


IMPRESSION: 


Ground glass opacities in the right middle lobe. Pneumonia versus contusion.


Opacification of the right lower lobe bronchi. Bronchial mucous plugging in the 


left lower lobe. 


Emphysematous lung disease.


Acute mildly displaced fracture of the sternal body. Early with history.





CT abdomen and pelvis on 1/20/19


IMPRESSION: 


Mildly lobulated contour of the liver. Possible mild cirrhosis.


Fatty infiltration of the liver. 


Cholelithiasis without acute cholecystitis.


Copious stool throughout the colon.


Small supraumbilical hernia containing loops of small bowel. No evidence of 


bowel obstruction.





CT head on 1/20/19


IMPRESSION: 


No acute intracranial hemorrhage.


Mild hypodensities in the periventricular white matter which are consistent 


with chronic small vessel ischemic disease. Unchanged from prior.


Ventricles are in proportion to the degree of atrophy. Mildly increased from 


prior.





Echo: 1/28/19 Dr. Paulson





CONCLUSIONS:


1.  Study is of limited technical quality.


2.  Normal LV size with mild LVH and probably normal systolic function.


3.  Aortic sclerosis but no stenosis.


4.  Trace mitral insufficiency.


5.  Mild tricuspid insufficiency.


6.  High central venous pressure but unable to reliably estimate pulmonary 

artery pressure under





ASSESSMENT/PLAN: 





AMS- alcholol withdrawl plus hepatic encephalopathy/alcohol hepatitis


-CT HEAD 01/27/2019: Negative for any acute changes.


-Continue with lactulose 30 ML's every 8 hours and hold if bowel movements 

greater than 3.


-c/w Serax when necessary for agitation


-Possible new baseline unsure if the patient needs long-term placement or his 

wife can take care of him


-Neurology consulted 


-EEG NEGATIVE





Cirrhosis, with bili 7, INR 1.9, meld score 22, 19.6% mortality rate in the next

3 months


-Will need appropriate diuresis, as blood pressure tolerates. 


-Steven Rosario consulted. 


-active alcohol use not a candidate for liver transplant referral


-[facility] discriminant factor: 75.5 poor prognosis, b/c >32 would benefit from 

prednisolone 40qd [id number] (start date 01/29/2019) then taper for 2 weeks. 


-Continue with lactulose 30 ML's q8h


-Abdominal ultrasound 01/27/2019:consistent with cirrhosis





-A. fib with RVR (resolved) sinus rhythm


-History of paroxysmal atrial fibrillation


-per medical records in 2017 


-was previously on Eliquis. 


-anticoagulation not indicated. [id number] VASc - 0


-c/w Cardizem CD 120MG  for rhythm and rate control


-Echo: Trace mitral insufficiency mild tricuspid insufficiency aortic sclerosis 

with mild left ventricular hypertrophy with normal systolic function.








Coagulopathy


-secondary to underlying cirrhosis with progressive liver failure


-This could be since of worsening liver function and prognosis is guarded. 


-s/p FFP/vitamin K.





Alcohol abuse


-will need appropriate counseling upon improvement as patient is altered at this

time. 


-discontinue Ativan


Serax 10 mg every 6 when necessary for agitation





Thrombocytopenia (Stable)


-likely secondary to cirrhosis. s/p 1 unit of platelets.





Hypoxia/ Pulmonary contusion/aspiration pneumonia(resolved)


-Completed 7Daysx moxifloxacin





Chest wall ecchymosis/sternal fracture (stable)- 


-Surgery consulted and recommendated for fracture is pain control and incentive 

spirometer. No surgical intervention at this time. 


-s/p platelets/FFP given ecchymosis - 2 units 


-H/H stable 


-Continue to monitor.





H/o COPD -


-c/w nebs 


-not on oxygen





Hypernatremia hypovolemia (Resolved)





PT


-continue to work and treat. 


-Continued rehabilitation at ?


-Will work with nurses today on lower extremity PT





DVT prophylaxis-SCDs





Overall prognosis guarded. 





Speech therapy recommended by mouth pured solids and honey thickened liquids. 





CODE STATUS: CMO -MOLST Form in the chart signed by wife, HCP, and Dr. Cifuentes. 





Disposition: Hospice consult monday,





VS, I&O, 24H, Fishbone


Vital Signs/I&O





Vital Signs








  Date Time  Temp Pulse Resp B/P (MAP) Pulse Ox O2 Delivery O2 Flow Rate FiO2


 


2/1/19 22:00 98.4 78 18 103/69 (80) 99   


 


1/30/19 04:00      Room Air  














I&O- Last 24 Hours up to 6 AM 


 


 2/2/19





 06:00


 


Intake Total 1140 ml


 


Output Total 300 ml


 


Balance 840 ml











Laboratory Data


24H LABS


Laboratory Tests 2


2/1/19 06:22: 


Nucleated Red Blood Cells % (auto) 0.0, Immature Platelet Fraction 4.4, 

Prothrombin Time 24.7H, Prothromb Time International Ratio 2.18, Anion Gap 5L, 

Glomerular Filtration Rate > 60.0, Calcium Level 8.0L, Magnesium Level 1.8, 

Aspartate Amino Transf (AST/SGOT) 59H, Alanine Aminotransferase (ALT/SGPT) 48, 

Alkaline Phosphatase 175H, Total Bilirubin 4.7H, Direct Bilirubin 3.0H, Total 

Protein 7.1, Albumin 2.1L, Albumin/Globulin Ratio 0.42L


CBC/BMP


Laboratory Tests


2/1/19 06:22








Red Blood Count 2.31 L, Mean Corpuscular Volume 127.3 H, Mean Corpuscular 

Hemoglobin 40.7 H, Mean Corpuscular Hemoglobin Concent 32.0, Red Cell 

Distribution Width 14.9 H


Microbiology





Microbiology


1/27/19 Blood Culture - Final, Complete


          NO GROWTH AFTER 5 DAYS


1/27/19 Blood Culture - Final, Complete


          NO GROWTH AFTER 5 DAYS


1/27/19 Urine Culture - Final, Complete





GME ATTESTATION


GME ATTESTATION


My faculty preceptor for this patient encounter was physically present during 

the encounter and was fully available. All aspects of the patient interview, 

examination, medical decision making process, and medical care plan development 

were reviewed and approved by the faculty preceptor. The faculty preceptor is 

aware and concurs with the plan as stated in the body of this note and will 

attest to such by his/her cosignature.





ATTENDING NOTE





I have both independently examined this patient as well as reviewed the note  I 

have discussed in detail the findings and plan of treatment as documented in the

note. I will continue to follow the patient and offer further guidance to the 

patients care as necessary during this hospital stay.





MARIA DE JESUS Reese MD, DO        Feb 2, 2019 06:11


HANANE CIFUENTES MD                       Feb 2, 2019 11:02

## 2019-02-03 RX ADMIN — IPRATROPIUM BROMIDE AND ALBUTEROL SULFATE SCH ML: .5; 3 SOLUTION RESPIRATORY (INHALATION) at 07:29

## 2019-02-03 RX ADMIN — MORPHINE SULFATE PRN MG: 10 SOLUTION ORAL at 09:14

## 2019-02-03 RX ADMIN — IPRATROPIUM BROMIDE AND ALBUTEROL SULFATE SCH ML: .5; 3 SOLUTION RESPIRATORY (INHALATION) at 11:46

## 2019-02-03 RX ADMIN — IPRATROPIUM BROMIDE AND ALBUTEROL SULFATE SCH ML: .5; 3 SOLUTION RESPIRATORY (INHALATION) at 21:29

## 2019-02-03 RX ADMIN — IPRATROPIUM BROMIDE AND ALBUTEROL SULFATE SCH ML: .5; 3 SOLUTION RESPIRATORY (INHALATION) at 15:18

## 2019-02-03 RX ADMIN — SODIUM CHLORIDE, PRESERVATIVE FREE SCH ML: 5 INJECTION INTRAVENOUS at 06:24

## 2019-02-03 RX ADMIN — MULTIPLE VITAMINS W/ MINERALS TAB SCH TAB: TAB at 08:21

## 2019-02-03 RX ADMIN — FOLIC ACID SCH MG: 1 TABLET ORAL at 08:21

## 2019-02-03 RX ADMIN — Medication SCH MG: at 08:21

## 2019-02-03 RX ADMIN — PREDNISOLONE SODIUM PHOSPHATE SCH MG: 15 SOLUTION ORAL at 08:21

## 2019-02-03 RX ADMIN — NICOTINE SCH PATCH: 21 PATCH, EXTENDED RELEASE TRANSDERMAL at 08:22

## 2019-02-04 RX ADMIN — IPRATROPIUM BROMIDE AND ALBUTEROL SULFATE SCH ML: .5; 3 SOLUTION RESPIRATORY (INHALATION) at 19:53

## 2019-02-04 RX ADMIN — SODIUM CHLORIDE, PRESERVATIVE FREE SCH ML: 5 INJECTION INTRAVENOUS at 00:17

## 2019-02-04 RX ADMIN — IPRATROPIUM BROMIDE AND ALBUTEROL SULFATE SCH ML: .5; 3 SOLUTION RESPIRATORY (INHALATION) at 16:00

## 2019-02-04 RX ADMIN — MULTIPLE VITAMINS W/ MINERALS TAB SCH TAB: TAB at 09:21

## 2019-02-04 RX ADMIN — SODIUM CHLORIDE, PRESERVATIVE FREE SCH ML: 5 INJECTION INTRAVENOUS at 06:00

## 2019-02-04 RX ADMIN — Medication SCH MG: at 09:21

## 2019-02-04 RX ADMIN — IPRATROPIUM BROMIDE AND ALBUTEROL SULFATE SCH ML: .5; 3 SOLUTION RESPIRATORY (INHALATION) at 07:31

## 2019-02-04 RX ADMIN — FOLIC ACID SCH MG: 1 TABLET ORAL at 09:20

## 2019-02-04 RX ADMIN — PREDNISOLONE SODIUM PHOSPHATE SCH MG: 15 SOLUTION ORAL at 09:21

## 2019-02-04 RX ADMIN — IPRATROPIUM BROMIDE AND ALBUTEROL SULFATE SCH ML: .5; 3 SOLUTION RESPIRATORY (INHALATION) at 11:24

## 2019-02-04 RX ADMIN — SODIUM CHLORIDE, PRESERVATIVE FREE SCH ML: 5 INJECTION INTRAVENOUS at 05:21

## 2019-02-04 RX ADMIN — NICOTINE SCH PATCH: 21 PATCH, EXTENDED RELEASE TRANSDERMAL at 09:22

## 2019-02-04 NOTE — IPN
DATE OF SERVICE:  02/03/2019

 

Patient seen and examined.  Currently continued to be confused although

arousable.  Follows simple command.  Patient currently comfort measures only.

 

PHYSICAL EXAMINATION:

General:  Patient alert, comfortable, scleral icterus.

HEENT:  Moist mucous membrane.

Cardiac:  Regular S1, S2.  Chest wall healing ecchymosis.

Pulmonary:  Bilateral clear.

Abdomen:  Soft, mild distention. Positive bowel sounds.

Extremities:  No edema bilateral lower extremities.

 

ASSESSMENT AND PLAN:  This is a 53-year-old male patient with underlying medical

history of alcohol abuse, chronic obstructive pulmonary disease (COPD),

cirrhosis, drinks about 12 beers per day, presented with mechanical fall and

shortness of breath, found to have sternal fracture, chest wall contusion,

decompensated liver failure.

 

PROBLEMS:

1.  Altered mental status secondary to hepatic encephalopathy, alcoholic

hepatitis.  Urology initially consulted.  EEG was done.  MRI of the brain was

also done.  Patient currently comfort measures only.

 

2.  Decompensated liver cirrhosis, MELD score of 22 with a 19.6 mortality over

the next 3 months.  GI has been consulted.  Actively taking alcohol.  Very poor

prognosis.  Initially on lactulose without much improvement.  Was treated for

alcoholic hepatitic with prednisone without much improvement.  Currently comfort

measures only, smoking, nicotine patch.

 

3.  Alcoholic hepatitis and cirrhosis.  Thiamine, folate, multivitamin.

Prednisone for treatment.  Currently comfort measures only.

 

4.  Coagulopathy secondary to cirrhosis, decompensated liver failure.  Patient

not responding with vitamin K.  Currently comfort measures only.

 

5.  Alcohol abuse.  Monitor for withdrawal.  Thiamine, folate, multivitamin.

Currently comfort measures only.

 

6.  Thrombocytopenia secondary to cirrhosis. Monitor clinically.

 

7.  Hypoxia with pulmonary contusion, aspiration pneumonia, completed

antibiotics, currently comfort measures only.

 

8.  Chest wall ecchymosis, sternal fracture, completed antibiotics as mentioned

above.  Initially transfused platelets and fresh frozen plasma.  Currently

comfort measures only.

 

9.  History of chronic obstructive pulmonary disease (COPD), currently does not

have any wheeze.  Oxygen as needed.

 

10.  Deep venous thrombosis (DVT) prophylaxis, thromboembolic deterrent stockings

(TEDS) and sequentials.

 

11.  Aspiration.  Speech and swallow appreciated.  Patient on pureed diet with

honey thick liquid.

 

DISPOSITION:  Case was discussed with patient's wife, informed of poor prognosis

and option of hospice given decompensated liver failure.  Not a candidate for

liver transplant.  Patient is subsequently made comfort measures only.  Hospice

has been consulted.

## 2019-02-05 RX ADMIN — IPRATROPIUM BROMIDE AND ALBUTEROL SULFATE SCH ML: .5; 3 SOLUTION RESPIRATORY (INHALATION) at 07:39

## 2019-02-05 RX ADMIN — Medication SCH MG: at 09:46

## 2019-02-05 RX ADMIN — PREDNISOLONE SODIUM PHOSPHATE SCH MG: 15 SOLUTION ORAL at 09:46

## 2019-02-05 RX ADMIN — NICOTINE SCH PATCH: 21 PATCH, EXTENDED RELEASE TRANSDERMAL at 09:45

## 2019-02-05 RX ADMIN — MULTIPLE VITAMINS W/ MINERALS TAB SCH TAB: TAB at 09:46

## 2019-02-05 RX ADMIN — IPRATROPIUM BROMIDE AND ALBUTEROL SULFATE SCH ML: .5; 3 SOLUTION RESPIRATORY (INHALATION) at 11:27

## 2019-02-05 RX ADMIN — IPRATROPIUM BROMIDE AND ALBUTEROL SULFATE SCH ML: .5; 3 SOLUTION RESPIRATORY (INHALATION) at 19:57

## 2019-02-05 RX ADMIN — FOLIC ACID SCH MG: 1 TABLET ORAL at 09:46

## 2019-02-05 RX ADMIN — IPRATROPIUM BROMIDE AND ALBUTEROL SULFATE SCH ML: .5; 3 SOLUTION RESPIRATORY (INHALATION) at 15:20

## 2019-02-05 RX ADMIN — MORPHINE SULFATE PRN MG: 10 SOLUTION ORAL at 09:45

## 2019-02-05 NOTE — NUR
Please upgrade to thin liquids, NO STRAWS, encourage small sips, upright position. 

-------------------------------------------------------------------------------

Addendum: 02/05/19 at 1255 by ENZO ARREDONDO [facility] SP

-------------------------------------------------------------------------------

Amended: Links added. eye redness

## 2019-02-06 RX ADMIN — NICOTINE SCH PATCH: 21 PATCH, EXTENDED RELEASE TRANSDERMAL at 10:25

## 2019-02-06 RX ADMIN — NICOTINE SCH PATCH: 21 PATCH, EXTENDED RELEASE TRANSDERMAL at 08:37

## 2019-02-06 RX ADMIN — IPRATROPIUM BROMIDE AND ALBUTEROL SULFATE SCH ML: .5; 3 SOLUTION RESPIRATORY (INHALATION) at 11:43

## 2019-02-06 RX ADMIN — IPRATROPIUM BROMIDE AND ALBUTEROL SULFATE SCH ML: .5; 3 SOLUTION RESPIRATORY (INHALATION) at 19:42

## 2019-02-06 RX ADMIN — PREDNISOLONE SODIUM PHOSPHATE SCH MG: 15 SOLUTION ORAL at 08:37

## 2019-02-06 RX ADMIN — IPRATROPIUM BROMIDE AND ALBUTEROL SULFATE SCH ML: .5; 3 SOLUTION RESPIRATORY (INHALATION) at 08:00

## 2019-02-06 RX ADMIN — FOLIC ACID SCH MG: 1 TABLET ORAL at 08:37

## 2019-02-06 RX ADMIN — Medication SCH MG: at 08:37

## 2019-02-06 RX ADMIN — IPRATROPIUM BROMIDE AND ALBUTEROL SULFATE SCH ML: .5; 3 SOLUTION RESPIRATORY (INHALATION) at 15:54

## 2019-02-06 RX ADMIN — ONDANSETRON PRN MG: 4 TABLET, ORALLY DISINTEGRATING ORAL at 10:25

## 2019-02-06 RX ADMIN — MULTIPLE VITAMINS W/ MINERALS TAB SCH TAB: TAB at 08:37

## 2019-02-07 RX ADMIN — IPRATROPIUM BROMIDE AND ALBUTEROL SULFATE SCH ML: .5; 3 SOLUTION RESPIRATORY (INHALATION) at 07:06

## 2019-02-07 RX ADMIN — PREDNISOLONE SODIUM PHOSPHATE SCH MG: 15 SOLUTION ORAL at 09:00

## 2019-02-07 RX ADMIN — IPRATROPIUM BROMIDE AND ALBUTEROL SULFATE SCH ML: .5; 3 SOLUTION RESPIRATORY (INHALATION) at 19:33

## 2019-02-07 RX ADMIN — IPRATROPIUM BROMIDE AND ALBUTEROL SULFATE SCH ML: .5; 3 SOLUTION RESPIRATORY (INHALATION) at 15:42

## 2019-02-07 RX ADMIN — NICOTINE SCH PATCH: 21 PATCH, EXTENDED RELEASE TRANSDERMAL at 09:12

## 2019-02-07 RX ADMIN — MORPHINE SULFATE PRN MG: 10 SOLUTION ORAL at 16:48

## 2019-02-07 RX ADMIN — FOLIC ACID SCH MG: 1 TABLET ORAL at 09:00

## 2019-02-07 RX ADMIN — Medication SCH MG: at 09:09

## 2019-02-07 RX ADMIN — MULTIPLE VITAMINS W/ MINERALS TAB SCH TAB: TAB at 09:09

## 2019-02-07 RX ADMIN — ONDANSETRON PRN MG: 4 TABLET, ORALLY DISINTEGRATING ORAL at 03:02

## 2019-02-07 RX ADMIN — IPRATROPIUM BROMIDE AND ALBUTEROL SULFATE SCH ML: .5; 3 SOLUTION RESPIRATORY (INHALATION) at 11:58

## 2019-02-07 NOTE — NUR
Recommending upgrade to level 2 mechanically altered (NDD) solids. Please cue pt to take 
small bites & use liquid wash. Please continue thin liquids NO STRAW.

-------------------------------------------------------------------------------

Addendum: 02/07/19 at 0831 by ENZO ARREDONDO [facility] SP

-------------------------------------------------------------------------------

Amended: Links added.

## 2019-02-08 RX ADMIN — IPRATROPIUM BROMIDE AND ALBUTEROL SULFATE SCH ML: .5; 3 SOLUTION RESPIRATORY (INHALATION) at 07:28

## 2019-02-08 RX ADMIN — Medication SCH MG: at 09:00

## 2019-02-08 RX ADMIN — IPRATROPIUM BROMIDE AND ALBUTEROL SULFATE SCH ML: .5; 3 SOLUTION RESPIRATORY (INHALATION) at 11:24

## 2019-02-08 RX ADMIN — MULTIPLE VITAMINS W/ MINERALS TAB SCH TAB: TAB at 09:00

## 2019-02-08 RX ADMIN — IPRATROPIUM BROMIDE AND ALBUTEROL SULFATE SCH ML: .5; 3 SOLUTION RESPIRATORY (INHALATION) at 15:57

## 2019-02-08 RX ADMIN — PREDNISOLONE SODIUM PHOSPHATE SCH MG: 15 SOLUTION ORAL at 09:00

## 2019-02-08 RX ADMIN — MORPHINE SULFATE PRN MG: 10 SOLUTION ORAL at 20:43

## 2019-02-08 RX ADMIN — IPRATROPIUM BROMIDE AND ALBUTEROL SULFATE SCH ML: .5; 3 SOLUTION RESPIRATORY (INHALATION) at 20:00

## 2019-02-08 RX ADMIN — NICOTINE SCH PATCH: 21 PATCH, EXTENDED RELEASE TRANSDERMAL at 09:00

## 2019-02-08 RX ADMIN — FOLIC ACID SCH MG: 1 TABLET ORAL at 09:00

## 2019-02-09 RX ADMIN — NICOTINE SCH PATCH: 21 PATCH, EXTENDED RELEASE TRANSDERMAL at 12:38

## 2019-02-09 RX ADMIN — IPRATROPIUM BROMIDE AND ALBUTEROL SULFATE SCH ML: .5; 3 SOLUTION RESPIRATORY (INHALATION) at 19:52

## 2019-02-09 RX ADMIN — MULTIPLE VITAMINS W/ MINERALS TAB SCH TAB: TAB at 09:00

## 2019-02-09 RX ADMIN — Medication SCH MG: at 09:00

## 2019-02-09 RX ADMIN — FOLIC ACID SCH MG: 1 TABLET ORAL at 09:00

## 2019-02-09 RX ADMIN — PREDNISOLONE SODIUM PHOSPHATE SCH MG: 15 SOLUTION ORAL at 09:00

## 2019-02-09 RX ADMIN — IPRATROPIUM BROMIDE AND ALBUTEROL SULFATE SCH ML: .5; 3 SOLUTION RESPIRATORY (INHALATION) at 10:46

## 2019-02-09 RX ADMIN — IPRATROPIUM BROMIDE AND ALBUTEROL SULFATE SCH ML: .5; 3 SOLUTION RESPIRATORY (INHALATION) at 07:30

## 2019-02-09 RX ADMIN — IPRATROPIUM BROMIDE AND ALBUTEROL SULFATE SCH ML: .5; 3 SOLUTION RESPIRATORY (INHALATION) at 14:52

## 2019-02-09 RX ADMIN — MORPHINE SULFATE PRN MG: 10 SOLUTION ORAL at 08:14

## 2019-02-09 RX ADMIN — MORPHINE SULFATE PRN MG: 10 SOLUTION ORAL at 23:42

## 2019-02-09 RX ADMIN — MORPHINE SULFATE PRN MG: 10 SOLUTION ORAL at 17:09

## 2019-02-10 RX ADMIN — IPRATROPIUM BROMIDE AND ALBUTEROL SULFATE SCH ML: .5; 3 SOLUTION RESPIRATORY (INHALATION) at 20:02

## 2019-02-10 RX ADMIN — MULTIPLE VITAMINS W/ MINERALS TAB SCH TAB: TAB at 09:00

## 2019-02-10 RX ADMIN — PREDNISOLONE SODIUM PHOSPHATE SCH MG: 15 SOLUTION ORAL at 09:00

## 2019-02-10 RX ADMIN — IPRATROPIUM BROMIDE AND ALBUTEROL SULFATE SCH ML: .5; 3 SOLUTION RESPIRATORY (INHALATION) at 11:12

## 2019-02-10 RX ADMIN — IPRATROPIUM BROMIDE AND ALBUTEROL SULFATE SCH ML: .5; 3 SOLUTION RESPIRATORY (INHALATION) at 16:09

## 2019-02-10 RX ADMIN — Medication SCH MG: at 09:00

## 2019-02-10 RX ADMIN — NICOTINE SCH PATCH: 21 PATCH, EXTENDED RELEASE TRANSDERMAL at 09:25

## 2019-02-10 RX ADMIN — FOLIC ACID SCH MG: 1 TABLET ORAL at 09:00

## 2019-02-10 RX ADMIN — IPRATROPIUM BROMIDE AND ALBUTEROL SULFATE SCH ML: .5; 3 SOLUTION RESPIRATORY (INHALATION) at 07:14

## 2019-02-10 NOTE — IPNPDOC
Text Note


Date of Service


The patient was seen on 2/10/19.





NOTE


Subjective: Feels well this am. Ate breakfast. 





PHYSICAL EXAMINATION:


Vitals: (see below) 


General: No acute distress, laying comfortably in bed.


HEENT: Moist mucous membranes. 


Neck: No JVD or lymphadenopathy


Cardiac: RRR, No murmurs. Eecchymosis on the chest wall improved. Tender to 

palpation.


Pulm: Clear to auscultation b/l. No wheezing, rhonchi


Abd: NT/mildly distended. + BS


Ext: Trace edema bilateral lower extremities. No cyanosis.


Neuro: 


Moving all ext. Following commands. 





LABORATORY DATA: See below.





IMAGING: 


CT chest on 1/20/19


IMPRESSION: 


Ground glass opacities in the right middle lobe. Pneumonia versus contusion.


Opacification of the right lower lobe bronchi. Bronchial mucous plugging in the 


left lower lobe. 


Emphysematous lung disease.


Acute mildly displaced fracture of the sternal body. Early with history.





CT abdomen and pelvis on 1/20/19


IMPRESSION: 


Mildly lobulated contour of the liver. Possible mild cirrhosis.


Fatty infiltration of the liver. 


Cholelithiasis without acute cholecystitis.


Copious stool throughout the colon.


Small supraumbilical hernia containing loops of small bowel. No evidence of 


bowel obstruction.





CT head on 1/20/19


IMPRESSION: 


No acute intracranial hemorrhage.


Mild hypodensities in the periventricular white matter which are consistent 


with chronic small vessel ischemic disease. Unchanged from prior.


Ventricles are in proportion to the degree of atrophy. Mildly increased from 


prior.





ASSESSMENT/PLAN: 


1. Hypoxia/ Pulmonary contusion/pneumonia- setting of a sternal fracture. 

Started on moxifloxacin, oxygen as needed. Admit to ICU. Incentive spirometer. 

Oxygen therapy. Dr. Breaux consulted. CXR with increased RLL infiltrate, ? 

Aspiration.


2. Chest wall ecchymosis/sternal fracture- appreciate Dr. Mantilla's input. 

Observation recommended. s/p platelets/FFP given ecchymosis. Pain control.  

Hemoglobin stable. Continue to monitor.


3. Coagulopathy- secondary to underlying cirrhosis with progressive liver 

failure. s/p FFP/vitamin K.


4. Alcohol abuse- will need appropriate counseling upon improvement as patient 

is altered at this time. Banana bag. CIWA. 


5. Cirrhosis, with bili 7, INR >, meld score> 22, 19.6% mortality rate in the 

next 3 months. Will need appropriate diuresis, as blood pressure tolerates. Steven Rosario consulted. 


6. Thrombocytopenia likely secondary to cirrhosis. s/p 1 unit of platelets.


7. History of paroxysmal atrial fibrillation per medical records in 2017 and was

previously on Eliquis. In the setting of chest wall ecchymosis, concern for 

further bleeding, hold off on blood thinners for now. 


8. H/o COPD - cont nebs 


9. Hepatic encephalopathy s/p 


DVT prophylaxis SCDs





Overall prognosis guarded. 





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





VS,Fishbone, I+O


VS, Fishbone, I+O





Vital Signs








  Date Time  Temp Pulse Resp B/P (MAP) Pulse Ox O2 Delivery O2 Flow Rate FiO2


 


2/10/19 00:12   14     














I&O- Last 24 Hours up to 6 AM 


 


 2/10/19





 06:00


 


Intake Total 720 ml


 


Output Total 650 ml


 


Balance 70 ml

















SHANNON TORRES MD                 Feb 10, 2019 10:30

## 2019-02-11 RX ADMIN — IPRATROPIUM BROMIDE AND ALBUTEROL SULFATE SCH ML: .5; 3 SOLUTION RESPIRATORY (INHALATION) at 07:24

## 2019-02-11 RX ADMIN — IPRATROPIUM BROMIDE AND ALBUTEROL SULFATE SCH ML: .5; 3 SOLUTION RESPIRATORY (INHALATION) at 11:26

## 2019-02-11 RX ADMIN — MULTIPLE VITAMINS W/ MINERALS TAB SCH TAB: TAB at 09:13

## 2019-02-11 RX ADMIN — FOLIC ACID SCH MG: 1 TABLET ORAL at 09:13

## 2019-02-11 RX ADMIN — PREDNISOLONE SODIUM PHOSPHATE SCH MG: 15 SOLUTION ORAL at 09:14

## 2019-02-11 RX ADMIN — NICOTINE SCH PATCH: 21 PATCH, EXTENDED RELEASE TRANSDERMAL at 09:13

## 2019-02-11 RX ADMIN — IPRATROPIUM BROMIDE AND ALBUTEROL SULFATE SCH ML: .5; 3 SOLUTION RESPIRATORY (INHALATION) at 15:10

## 2019-02-11 RX ADMIN — Medication SCH MG: at 09:13

## 2019-02-11 RX ADMIN — IPRATROPIUM BROMIDE AND ALBUTEROL SULFATE SCH ML: .5; 3 SOLUTION RESPIRATORY (INHALATION) at 20:00

## 2019-02-12 RX ADMIN — FOLIC ACID SCH MG: 1 TABLET ORAL at 08:24

## 2019-02-12 RX ADMIN — IPRATROPIUM BROMIDE AND ALBUTEROL SULFATE SCH ML: .5; 3 SOLUTION RESPIRATORY (INHALATION) at 11:20

## 2019-02-12 RX ADMIN — PREDNISOLONE SODIUM PHOSPHATE SCH MG: 15 SOLUTION ORAL at 08:24

## 2019-02-12 RX ADMIN — MULTIPLE VITAMINS W/ MINERALS TAB SCH TAB: TAB at 08:24

## 2019-02-12 RX ADMIN — IPRATROPIUM BROMIDE AND ALBUTEROL SULFATE SCH ML: .5; 3 SOLUTION RESPIRATORY (INHALATION) at 08:00

## 2019-02-12 RX ADMIN — Medication SCH MG: at 08:24

## 2019-02-12 RX ADMIN — NICOTINE SCH PATCH: 21 PATCH, EXTENDED RELEASE TRANSDERMAL at 08:24

## 2019-02-12 NOTE — NUR
Pt tolerating current diet level w/ adequate and safe PO intake. Wife has been educated & 
agrees w/ recommendations. 



Discharging from [facility] tx this date w/ the following recommendations: 

Level 2 Mechanically Altered solids (NDD), small bites

Thin liquids, no straw

Lars's Peanut Butter Cup cut in small bites is OK

-------------------------------------------------------------------------------

Addendum: 02/12/19 at 1242 by ST MALGORZATA [facility] SUZANNE

-------------------------------------------------------------------------------

Amended: Links added.

## 2019-02-13 RX ADMIN — PREDNISOLONE SODIUM PHOSPHATE SCH MG: 15 SOLUTION ORAL at 09:00

## 2019-02-13 RX ADMIN — Medication SCH MG: at 09:00

## 2019-02-13 RX ADMIN — FOLIC ACID SCH MG: 1 TABLET ORAL at 09:00

## 2019-02-13 RX ADMIN — NICOTINE SCH PATCH: 21 PATCH, EXTENDED RELEASE TRANSDERMAL at 09:06

## 2019-02-13 RX ADMIN — MULTIPLE VITAMINS W/ MINERALS TAB SCH TAB: TAB at 09:00

## 2019-02-14 RX ADMIN — Medication SCH MG: at 10:44

## 2019-02-14 RX ADMIN — FOLIC ACID SCH MG: 1 TABLET ORAL at 10:44

## 2019-02-14 RX ADMIN — PREDNISOLONE SODIUM PHOSPHATE SCH MG: 15 SOLUTION ORAL at 10:44

## 2019-02-14 RX ADMIN — NICOTINE SCH PATCH: 21 PATCH, EXTENDED RELEASE TRANSDERMAL at 10:28

## 2019-02-14 RX ADMIN — MULTIPLE VITAMINS W/ MINERALS TAB SCH TAB: TAB at 10:44

## 2019-02-15 RX ADMIN — NICOTINE SCH PATCH: 21 PATCH, EXTENDED RELEASE TRANSDERMAL at 08:14

## 2019-02-15 RX ADMIN — Medication SCH MG: at 08:09

## 2019-02-15 RX ADMIN — FOLIC ACID SCH MG: 1 TABLET ORAL at 08:09

## 2019-02-15 RX ADMIN — MULTIPLE VITAMINS W/ MINERALS TAB SCH TAB: TAB at 08:09

## 2019-02-15 RX ADMIN — PREDNISOLONE SODIUM PHOSPHATE SCH MG: 15 SOLUTION ORAL at 08:09

## 2019-02-15 NOTE — IPN
DATE:  02/15/2019

 

Patient currently on comfort measure only under alternate level of care in no

acute distress who was lethargic and very poor historian, confused.

 

PHYSICAL EXAM:

GENERAL: Patient comfortable, sclera icterus.

HEENT: Moist mucous membranes.

CARDIAC: Mild tachycardia, regular S1, S2.

PULMONARY: Bilateral  clear.

ABDOMEN: Soft, positive bowel sounds, nontender.

EXTREMITIES: No edema.

 

ASSESSMENT AND PLAN:

This is a 53-year-old male patient with underlying medical history of alcohol

abuse, COPD, cirrhosis, drinks about 12 beers per day who presented with

shortness of breath and found to have sternal fracture and chest wall contusion,

decompensated liver failure, currently on comfort measure only.

 

PROBLEMS:

1. Altered mental status secondary to hepatic encephalopathy, alcoholic

hepatitis. Urology initially consulted. EEG was done. MRI of the brain was done.

Patient currently comfort measures only.

2. Decompensated liver cirrhosis, score of 22. GI was consulted, patient active

alcohol drinker. Very poor prognosis. Initially on lactulose without improvement.

Was treated for alcoholic hepatitis without much improvement. Currently comfort

measures only.

3. Smoking. Nicotine patch.

4. Alcoholic hepatitis and cirrhosis. Patient currently comfort measures.

Continue Mo catheter, thiamine, multivitamin, Ativan as needed.

5. Coagulopathy secondary to cirrhosis and decompensated liver failure. Patient

not responding to vitamin K. Currently comfort measures only.

6. Alcohol abuse. Thiamine, Mo and multivitamin. Comfort measure only.

7. Thrombocytopenia secondary to cirrhosis.

8. Hypoxia with pulmonary contusion and aspiration pneumonia. Continue with

antibiotics. Current comfort measures only.

9. Chest wall ecchymosis and sternal fracture. Supportive care.

10. History of COPD, currently does not have any wheeze. Oxygen as needed.

11. DVT prophylaxis. TEDs and sequential.

12. Aspiration, diet as tolerated. Comfort measure only. Patient on mechanical

soft diet.

 

DISPOSITION: Pending [facility] versus nursing home. Currently comfort measure

only.

## 2019-02-16 RX ADMIN — PREDNISOLONE SODIUM PHOSPHATE SCH MG: 15 SOLUTION ORAL at 09:00

## 2019-02-16 RX ADMIN — MORPHINE SULFATE PRN MG: 10 SOLUTION ORAL at 12:43

## 2019-02-16 RX ADMIN — Medication SCH MG: at 09:00

## 2019-02-16 RX ADMIN — FOLIC ACID SCH MG: 1 TABLET ORAL at 09:00

## 2019-02-16 RX ADMIN — NICOTINE SCH PATCH: 21 PATCH, EXTENDED RELEASE TRANSDERMAL at 12:43

## 2019-02-16 RX ADMIN — MULTIPLE VITAMINS W/ MINERALS TAB SCH TAB: TAB at 09:00

## 2019-02-17 RX ADMIN — MULTIPLE VITAMINS W/ MINERALS TAB SCH TAB: TAB at 09:00

## 2019-02-17 RX ADMIN — FOLIC ACID SCH MG: 1 TABLET ORAL at 09:00

## 2019-02-17 RX ADMIN — MORPHINE SULFATE PRN MG: 10 SOLUTION ORAL at 02:09

## 2019-02-17 RX ADMIN — PREDNISOLONE SODIUM PHOSPHATE SCH MG: 15 SOLUTION ORAL at 09:00

## 2019-02-17 RX ADMIN — Medication SCH MG: at 09:00

## 2019-02-17 RX ADMIN — NICOTINE SCH PATCH: 21 PATCH, EXTENDED RELEASE TRANSDERMAL at 10:38

## 2019-02-18 RX ADMIN — FOLIC ACID SCH MG: 1 TABLET ORAL at 08:54

## 2019-02-18 RX ADMIN — Medication SCH MG: at 08:55

## 2019-02-18 RX ADMIN — MULTIPLE VITAMINS W/ MINERALS TAB SCH TAB: TAB at 08:55

## 2019-02-18 RX ADMIN — PREDNISOLONE SODIUM PHOSPHATE SCH MG: 15 SOLUTION ORAL at 08:54

## 2019-02-18 RX ADMIN — NICOTINE SCH PATCH: 21 PATCH, EXTENDED RELEASE TRANSDERMAL at 09:00

## 2019-02-19 RX ADMIN — FOLIC ACID SCH MG: 1 TABLET ORAL at 07:49

## 2019-02-19 RX ADMIN — NICOTINE SCH PATCH: 21 PATCH, EXTENDED RELEASE TRANSDERMAL at 07:50

## 2019-02-19 RX ADMIN — Medication SCH MG: at 07:49

## 2019-02-19 RX ADMIN — PREDNISOLONE SODIUM PHOSPHATE SCH MG: 15 SOLUTION ORAL at 07:49

## 2019-02-19 RX ADMIN — MORPHINE SULFATE PRN MG: 10 SOLUTION ORAL at 09:12

## 2019-02-19 RX ADMIN — MULTIPLE VITAMINS W/ MINERALS TAB SCH TAB: TAB at 07:49

## 2019-02-20 RX ADMIN — MULTIPLE VITAMINS W/ MINERALS TAB SCH TAB: TAB at 09:00

## 2019-02-20 RX ADMIN — FOLIC ACID SCH MG: 1 TABLET ORAL at 09:00

## 2019-02-20 RX ADMIN — Medication SCH MG: at 09:00

## 2019-02-20 RX ADMIN — PREDNISOLONE SODIUM PHOSPHATE SCH MG: 15 SOLUTION ORAL at 09:00

## 2019-02-20 RX ADMIN — NICOTINE SCH PATCH: 21 PATCH, EXTENDED RELEASE TRANSDERMAL at 09:00

## 2019-02-21 RX ADMIN — NICOTINE SCH PATCH: 21 PATCH, EXTENDED RELEASE TRANSDERMAL at 09:56

## 2019-02-21 RX ADMIN — FOLIC ACID SCH MG: 1 TABLET ORAL at 09:56

## 2019-02-21 RX ADMIN — PREDNISOLONE SODIUM PHOSPHATE SCH MG: 15 SOLUTION ORAL at 10:03

## 2019-02-21 RX ADMIN — Medication SCH MG: at 09:00

## 2019-02-21 RX ADMIN — MULTIPLE VITAMINS W/ MINERALS TAB SCH TAB: TAB at 09:56

## 2019-02-21 RX ADMIN — MORPHINE SULFATE PRN MG: 10 SOLUTION ORAL at 10:06

## 2019-02-22 RX ADMIN — FOLIC ACID SCH MG: 1 TABLET ORAL at 09:00

## 2019-02-22 RX ADMIN — NICOTINE SCH PATCH: 21 PATCH, EXTENDED RELEASE TRANSDERMAL at 11:29

## 2019-02-22 RX ADMIN — Medication SCH MG: at 09:00

## 2019-02-22 RX ADMIN — MULTIPLE VITAMINS W/ MINERALS TAB SCH TAB: TAB at 09:00

## 2019-02-22 RX ADMIN — PREDNISOLONE SODIUM PHOSPHATE SCH MG: 15 SOLUTION ORAL at 09:00

## 2019-02-23 RX ADMIN — Medication SCH MG: at 09:36

## 2019-02-23 RX ADMIN — FOLIC ACID SCH MG: 1 TABLET ORAL at 09:36

## 2019-02-23 RX ADMIN — NICOTINE SCH PATCH: 21 PATCH, EXTENDED RELEASE TRANSDERMAL at 09:37

## 2019-02-23 RX ADMIN — PREDNISOLONE SODIUM PHOSPHATE SCH MG: 15 SOLUTION ORAL at 09:36

## 2019-02-23 RX ADMIN — MULTIPLE VITAMINS W/ MINERALS TAB SCH TAB: TAB at 09:36

## 2019-02-24 RX ADMIN — Medication SCH MG: at 09:10

## 2019-02-24 RX ADMIN — PREDNISOLONE SODIUM PHOSPHATE SCH MG: 15 SOLUTION ORAL at 09:10

## 2019-02-24 RX ADMIN — MULTIPLE VITAMINS W/ MINERALS TAB SCH TAB: TAB at 09:10

## 2019-02-24 RX ADMIN — FOLIC ACID SCH MG: 1 TABLET ORAL at 09:10

## 2019-02-24 RX ADMIN — NICOTINE SCH PATCH: 21 PATCH, EXTENDED RELEASE TRANSDERMAL at 09:11

## 2019-02-25 RX ADMIN — NICOTINE SCH PATCH: 21 PATCH, EXTENDED RELEASE TRANSDERMAL at 10:25

## 2019-02-25 RX ADMIN — Medication SCH: at 21:00

## 2019-02-25 RX ADMIN — MULTIPLE VITAMINS W/ MINERALS TAB SCH TAB: TAB at 10:25

## 2019-02-25 RX ADMIN — MORPHINE SULFATE PRN MG: 10 SOLUTION ORAL at 05:01

## 2019-02-25 RX ADMIN — FOLIC ACID SCH MG: 1 TABLET ORAL at 09:00

## 2019-02-25 RX ADMIN — Medication SCH MG: at 10:25

## 2019-02-25 RX ADMIN — PREDNISOLONE SODIUM PHOSPHATE SCH MG: 15 SOLUTION ORAL at 10:26

## 2019-02-25 RX ADMIN — MORPHINE SULFATE PRN MG: 10 SOLUTION ORAL at 10:28
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
## 2019-02-26 RX ADMIN — LIDOCAINE SCH PATCH: 50 PATCH CUTANEOUS at 09:05

## 2019-02-26 RX ADMIN — MORPHINE SULFATE PRN MG: 10 SOLUTION ORAL at 04:05

## 2019-02-26 RX ADMIN — Medication SCH: at 20:15

## 2019-02-26 RX ADMIN — PREDNISOLONE SODIUM PHOSPHATE SCH MG: 15 SOLUTION ORAL at 09:06

## 2019-02-26 RX ADMIN — NICOTINE SCH PATCH: 21 PATCH, EXTENDED RELEASE TRANSDERMAL at 09:06

## 2019-02-26 RX ADMIN — FOLIC ACID SCH MG: 1 TABLET ORAL at 09:04

## 2019-02-26 RX ADMIN — Medication SCH MG: at 09:04

## 2019-02-26 RX ADMIN — MULTIPLE VITAMINS W/ MINERALS TAB SCH TAB: TAB at 09:04

## 2019-02-27 RX ADMIN — FOLIC ACID SCH MG: 1 TABLET ORAL at 09:30

## 2019-02-27 RX ADMIN — NICOTINE SCH PATCH: 21 PATCH, EXTENDED RELEASE TRANSDERMAL at 09:31

## 2019-02-27 RX ADMIN — MULTIPLE VITAMINS W/ MINERALS TAB SCH TAB: TAB at 09:30

## 2019-02-27 RX ADMIN — PREDNISOLONE SODIUM PHOSPHATE SCH MG: 15 SOLUTION ORAL at 09:31

## 2019-02-27 RX ADMIN — MORPHINE SULFATE PRN MG: 10 SOLUTION ORAL at 13:24

## 2019-02-27 RX ADMIN — Medication SCH: at 20:15

## 2019-02-27 RX ADMIN — Medication SCH MG: at 09:30

## 2019-02-27 RX ADMIN — LIDOCAINE SCH PATCH: 50 PATCH CUTANEOUS at 09:31

## 2019-02-28 RX ADMIN — PREDNISOLONE SODIUM PHOSPHATE SCH MG: 15 SOLUTION ORAL at 08:28

## 2019-02-28 RX ADMIN — LIDOCAINE SCH PATCH: 50 PATCH CUTANEOUS at 08:29

## 2019-02-28 RX ADMIN — Medication SCH MG: at 08:28

## 2019-02-28 RX ADMIN — NICOTINE SCH PATCH: 21 PATCH, EXTENDED RELEASE TRANSDERMAL at 08:29

## 2019-02-28 RX ADMIN — Medication SCH: at 20:26

## 2019-02-28 RX ADMIN — MULTIPLE VITAMINS W/ MINERALS TAB SCH TAB: TAB at 08:28

## 2019-02-28 RX ADMIN — MORPHINE SULFATE PRN MG: 10 SOLUTION ORAL at 10:01

## 2019-02-28 RX ADMIN — FOLIC ACID SCH MG: 1 TABLET ORAL at 08:28

## 2019-03-01 RX ADMIN — LIDOCAINE SCH PATCH: 50 PATCH CUTANEOUS at 08:01

## 2019-03-01 RX ADMIN — FOLIC ACID SCH MG: 1 TABLET ORAL at 08:01

## 2019-03-01 RX ADMIN — MORPHINE SULFATE PRN MG: 10 SOLUTION ORAL at 12:45

## 2019-03-01 RX ADMIN — MULTIPLE VITAMINS W/ MINERALS TAB SCH TAB: TAB at 08:01

## 2019-03-01 RX ADMIN — PREDNISOLONE SODIUM PHOSPHATE SCH MG: 15 SOLUTION ORAL at 08:02

## 2019-03-01 RX ADMIN — Medication SCH MG: at 08:01

## 2019-03-01 RX ADMIN — Medication SCH: at 21:00

## 2019-03-01 RX ADMIN — NICOTINE SCH PATCH: 21 PATCH, EXTENDED RELEASE TRANSDERMAL at 08:01

## 2019-03-01 RX ADMIN — MORPHINE SULFATE PRN MG: 10 SOLUTION ORAL at 09:31

## 2019-03-01 NOTE — IPN
DATE:  03/01/2019

 

Patient currently comfortable. Arousable. Answers simple questions. Poor

historian. COMFORT MEASURES ONLY.

 

GENERAL: Patient alert, comfortable.

HEENT: Moist mucous membranes.

CARDIAC: Regular, S1, S2.

PULMONARY: Bilateral clear.

ABDOMEN: Soft, nontender.

EXTREMITIES: No edema.

 

ASSESSMENT AND PLAN:

This is a 53-year-old male patient with underlying medical history of alcohol

abuse, chronic obstructive pulmonary disease (COPD), cirrhosis, drank about 12

beers per day, presented with shortness of breath, found to have sternal fracture

and chest wall contusion. Decompensated liver failure. Currently on COMFORT

MEASURES ONLY. Awaiting nursing home placement.

 

PROBLEMS:

1. Altered mental status secondary to hepatic encephalopathy and alcoholic

hepatitis. Neurology consulted initially. EEG was done. MRI of the brain was

done. Currently comfort measures only.

2. Decompensated liver cirrhosis. Model for end-stage liver disease (MELD) score

of 22. GI was consulted. Patient is active alcohol drinker. Very poor prognosis.

Currently comfort measures only. Initially treated with lactulose and steroids.

3. Smoking. Nicotine patch.

4. Alcoholic hepatitis and cirrhosis. Currently comfort measures only. Supportive

care. Ativan as needed, thiamine, folic acid is prescribed.

5.  Thrombocytopenia secondary to cirrhosis.

6.  Hypoxia and pulmonary contusion, aspiration pneumonia. Treated.

7. Chest wall ecchymosis and sternal fracture. Supportive care.

8. History of COPD. Currently does not have any wheeze. Supportive care.

9. Aspiration, diet as tolerated.

10. Deep venous thrombosis (DVT) prophylaxis. TEDs and sequentials.

 

DISPOSITION: Pending nursing home placement. COMFORT MEASURES ONLY.

## 2019-03-02 RX ADMIN — PREDNISOLONE SODIUM PHOSPHATE SCH MG: 15 SOLUTION ORAL at 10:18

## 2019-03-02 RX ADMIN — NICOTINE SCH PATCH: 21 PATCH, EXTENDED RELEASE TRANSDERMAL at 10:00

## 2019-03-02 RX ADMIN — LIDOCAINE SCH PATCH: 50 PATCH CUTANEOUS at 10:01

## 2019-03-02 RX ADMIN — NYSTATIN SCH DOSE: 100000 POWDER TOPICAL at 23:01

## 2019-03-02 RX ADMIN — FOLIC ACID SCH MG: 1 TABLET ORAL at 10:01

## 2019-03-02 RX ADMIN — MULTIPLE VITAMINS W/ MINERALS TAB SCH TAB: TAB at 10:01

## 2019-03-02 RX ADMIN — Medication SCH MG: at 09:00

## 2019-03-02 RX ADMIN — Medication SCH: at 21:00

## 2019-03-02 RX ADMIN — MORPHINE SULFATE PRN MG: 10 SOLUTION ORAL at 09:59

## 2019-03-02 RX ADMIN — NYSTATIN SCH DOSE: 100000 POWDER TOPICAL at 15:02

## 2019-03-03 RX ADMIN — MORPHINE SULFATE PRN MG: 10 SOLUTION ORAL at 09:59

## 2019-03-03 RX ADMIN — NYSTATIN SCH DOSE: 100000 POWDER TOPICAL at 20:47

## 2019-03-03 RX ADMIN — MULTIPLE VITAMINS W/ MINERALS TAB SCH TAB: TAB at 09:58

## 2019-03-03 RX ADMIN — LIDOCAINE SCH PATCH: 50 PATCH CUTANEOUS at 10:01

## 2019-03-03 RX ADMIN — PREDNISOLONE SODIUM PHOSPHATE SCH MG: 15 SOLUTION ORAL at 09:59

## 2019-03-03 RX ADMIN — NYSTATIN SCH DOSE: 100000 POWDER TOPICAL at 10:02

## 2019-03-03 RX ADMIN — FOLIC ACID SCH MG: 1 TABLET ORAL at 09:58

## 2019-03-03 RX ADMIN — NICOTINE SCH PATCH: 21 PATCH, EXTENDED RELEASE TRANSDERMAL at 10:00

## 2019-03-03 RX ADMIN — Medication SCH MG: at 09:00

## 2019-03-03 RX ADMIN — Medication SCH: at 20:47

## 2019-03-04 RX ADMIN — LIDOCAINE SCH PATCH: 50 PATCH CUTANEOUS at 08:32

## 2019-03-04 RX ADMIN — NYSTATIN SCH DOSE: 100000 POWDER TOPICAL at 22:28

## 2019-03-04 RX ADMIN — MORPHINE SULFATE PRN MG: 10 SOLUTION ORAL at 03:58

## 2019-03-04 RX ADMIN — NICOTINE SCH PATCH: 21 PATCH, EXTENDED RELEASE TRANSDERMAL at 08:32

## 2019-03-04 RX ADMIN — NYSTATIN SCH DOSE: 100000 POWDER TOPICAL at 08:33

## 2019-03-04 RX ADMIN — MULTIPLE VITAMINS W/ MINERALS TAB SCH TAB: TAB at 08:32

## 2019-03-04 RX ADMIN — Medication SCH: at 21:00

## 2019-03-04 RX ADMIN — Medication SCH MG: at 08:32

## 2019-03-04 RX ADMIN — FOLIC ACID SCH MG: 1 TABLET ORAL at 08:33

## 2019-03-05 RX ADMIN — FOLIC ACID SCH MG: 1 TABLET ORAL at 08:50

## 2019-03-05 RX ADMIN — NYSTATIN SCH DOSE: 100000 POWDER TOPICAL at 21:40

## 2019-03-05 RX ADMIN — Medication SCH MG: at 08:50

## 2019-03-05 RX ADMIN — MULTIPLE VITAMINS W/ MINERALS TAB SCH TAB: TAB at 08:50

## 2019-03-05 RX ADMIN — NYSTATIN SCH DOSE: 100000 POWDER TOPICAL at 08:50

## 2019-03-05 RX ADMIN — Medication SCH: at 21:00

## 2019-03-05 RX ADMIN — LIDOCAINE SCH PATCH: 50 PATCH CUTANEOUS at 08:49

## 2019-03-05 RX ADMIN — NICOTINE SCH PATCH: 21 PATCH, EXTENDED RELEASE TRANSDERMAL at 08:50

## 2019-03-06 RX ADMIN — NICOTINE SCH PATCH: 21 PATCH, EXTENDED RELEASE TRANSDERMAL at 07:28

## 2019-03-06 RX ADMIN — Medication SCH: at 21:00

## 2019-03-06 RX ADMIN — MULTIPLE VITAMINS W/ MINERALS TAB SCH TAB: TAB at 07:28

## 2019-03-06 RX ADMIN — FOLIC ACID SCH MG: 1 TABLET ORAL at 07:28

## 2019-03-06 RX ADMIN — Medication SCH MG: at 07:28

## 2019-03-06 RX ADMIN — NYSTATIN SCH DOSE: 100000 POWDER TOPICAL at 07:29

## 2019-03-06 RX ADMIN — LIDOCAINE SCH PATCH: 50 PATCH CUTANEOUS at 07:28

## 2019-03-06 RX ADMIN — NYSTATIN SCH DOSE: 100000 POWDER TOPICAL at 21:04

## 2019-03-07 RX ADMIN — NYSTATIN SCH DOSE: 100000 POWDER TOPICAL at 08:30

## 2019-03-07 RX ADMIN — MULTIPLE VITAMINS W/ MINERALS TAB SCH TAB: TAB at 08:22

## 2019-03-07 RX ADMIN — NYSTATIN SCH DOSE: 100000 POWDER TOPICAL at 22:02

## 2019-03-07 RX ADMIN — Medication SCH: at 21:00

## 2019-03-07 RX ADMIN — LIDOCAINE SCH PATCH: 50 PATCH CUTANEOUS at 08:22

## 2019-03-07 RX ADMIN — Medication SCH MG: at 08:21

## 2019-03-07 RX ADMIN — FOLIC ACID SCH MG: 1 TABLET ORAL at 08:21

## 2019-03-07 RX ADMIN — NICOTINE SCH PATCH: 21 PATCH, EXTENDED RELEASE TRANSDERMAL at 08:23

## 2019-03-08 RX ADMIN — Medication SCH MG: at 08:04

## 2019-03-08 RX ADMIN — FOLIC ACID SCH MG: 1 TABLET ORAL at 08:05

## 2019-03-08 RX ADMIN — NYSTATIN SCH DOSE: 100000 POWDER TOPICAL at 08:06

## 2019-03-08 RX ADMIN — NICOTINE SCH PATCH: 21 PATCH, EXTENDED RELEASE TRANSDERMAL at 08:05

## 2019-03-08 RX ADMIN — LIDOCAINE SCH PATCH: 50 PATCH CUTANEOUS at 08:06

## 2019-03-08 RX ADMIN — MULTIPLE VITAMINS W/ MINERALS TAB SCH TAB: TAB at 08:04
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
